# Patient Record
Sex: FEMALE | Race: BLACK OR AFRICAN AMERICAN | NOT HISPANIC OR LATINO | Employment: UNEMPLOYED | URBAN - METROPOLITAN AREA
[De-identification: names, ages, dates, MRNs, and addresses within clinical notes are randomized per-mention and may not be internally consistent; named-entity substitution may affect disease eponyms.]

---

## 2017-06-22 ENCOUNTER — TELEPHONE (OUTPATIENT)
Dept: OBSTETRICS AND GYNECOLOGY | Facility: CLINIC | Age: 32
End: 2017-06-22

## 2017-06-22 NOTE — TELEPHONE ENCOUNTER
----- Message from Stephanie Tompkins sent at 6/22/2017 10:44 AM CDT -----  Contact: pt  Pt would like a same day new ob appt in [place] this afternoon if at all possible.  Her pregnancy was confirmed by her pcp Dr Kapoor by blood test and he told her her HCG was elevated.  She is in extreme discomfort with burning in her back and severe nausea and vomiting and diarrhea and also fatigue.  Please call pt ASAP at [phone]

## 2017-06-27 ENCOUNTER — OFFICE VISIT (OUTPATIENT)
Dept: OBSTETRICS AND GYNECOLOGY | Facility: CLINIC | Age: 32
End: 2017-06-27
Payer: MEDICAID

## 2017-06-27 ENCOUNTER — LAB VISIT (OUTPATIENT)
Dept: LAB | Facility: HOSPITAL | Age: 32
End: 2017-06-27
Attending: OBSTETRICS & GYNECOLOGY
Payer: MEDICAID

## 2017-06-27 ENCOUNTER — PROCEDURE VISIT (OUTPATIENT)
Dept: OBSTETRICS AND GYNECOLOGY | Facility: CLINIC | Age: 32
End: 2017-06-27
Payer: MEDICAID

## 2017-06-27 VITALS
DIASTOLIC BLOOD PRESSURE: 84 MMHG | SYSTOLIC BLOOD PRESSURE: 126 MMHG | WEIGHT: 201.75 LBS | BODY MASS INDEX: 38.09 KG/M2 | HEIGHT: 61 IN

## 2017-06-27 DIAGNOSIS — O09.32 LATE PRENATAL CARE AFFECTING PREGNANCY IN SECOND TRIMESTER: ICD-10-CM

## 2017-06-27 DIAGNOSIS — Z32.01 PREGNANCY EXAMINATION OR TEST, POSITIVE RESULT: ICD-10-CM

## 2017-06-27 DIAGNOSIS — Z98.891 PREVIOUS CESAREAN SECTION: ICD-10-CM

## 2017-06-27 DIAGNOSIS — Z85.07 PERSONAL HISTORY OF PANCREATIC CANCER: ICD-10-CM

## 2017-06-27 DIAGNOSIS — Z30.2 REQUEST FOR STERILIZATION: ICD-10-CM

## 2017-06-27 DIAGNOSIS — Z36.3 ENCOUNTER FOR ROUTINE SCREENING FOR MALFORMATION USING ULTRASONICS: ICD-10-CM

## 2017-06-27 DIAGNOSIS — Z32.01 PREGNANCY EXAMINATION OR TEST, POSITIVE RESULT: Primary | ICD-10-CM

## 2017-06-27 LAB
ABO + RH BLD: NORMAL
BASOPHILS # BLD AUTO: 0.02 K/UL
BASOPHILS NFR BLD: 0.2 %
BLD GP AB SCN CELLS X3 SERPL QL: NORMAL
DIFFERENTIAL METHOD: ABNORMAL
EOSINOPHIL # BLD AUTO: 0.1 K/UL
EOSINOPHIL NFR BLD: 1.5 %
ERYTHROCYTE [DISTWIDTH] IN BLOOD BY AUTOMATED COUNT: 16 %
HCT VFR BLD AUTO: 34.5 %
HGB BLD-MCNC: 11 G/DL
HGB S BLD QL SOLY: NEGATIVE
LYMPHOCYTES # BLD AUTO: 2.3 K/UL
LYMPHOCYTES NFR BLD: 24.9 %
MCH RBC QN AUTO: 28.1 PG
MCHC RBC AUTO-ENTMCNC: 31.9 %
MCV RBC AUTO: 88 FL
MONOCYTES # BLD AUTO: 0.7 K/UL
MONOCYTES NFR BLD: 7.6 %
NEUTROPHILS # BLD AUTO: 5.9 K/UL
NEUTROPHILS NFR BLD: 64.8 %
PLATELET # BLD AUTO: 319 K/UL
PMV BLD AUTO: 12.2 FL
RBC # BLD AUTO: 3.92 M/UL
WBC # BLD AUTO: 9.12 K/UL

## 2017-06-27 PROCEDURE — 86703 HIV-1/HIV-2 1 RESULT ANTBDY: CPT

## 2017-06-27 PROCEDURE — 99203 OFFICE O/P NEW LOW 30 MIN: CPT | Mod: PBBFAC,25,PN | Performed by: OBSTETRICS & GYNECOLOGY

## 2017-06-27 PROCEDURE — 99202 OFFICE O/P NEW SF 15 MIN: CPT | Mod: 25,TH,S$PBB, | Performed by: OBSTETRICS & GYNECOLOGY

## 2017-06-27 PROCEDURE — 87340 HEPATITIS B SURFACE AG IA: CPT

## 2017-06-27 PROCEDURE — 86901 BLOOD TYPING SEROLOGIC RH(D): CPT

## 2017-06-27 PROCEDURE — 85660 RBC SICKLE CELL TEST: CPT

## 2017-06-27 PROCEDURE — 86762 RUBELLA ANTIBODY: CPT

## 2017-06-27 PROCEDURE — 86592 SYPHILIS TEST NON-TREP QUAL: CPT

## 2017-06-27 PROCEDURE — 86900 BLOOD TYPING SEROLOGIC ABO: CPT

## 2017-06-27 PROCEDURE — 85025 COMPLETE CBC W/AUTO DIFF WBC: CPT

## 2017-06-27 PROCEDURE — 76805 OB US >/= 14 WKS SNGL FETUS: CPT | Mod: 26,S$PBB,, | Performed by: OBSTETRICS & GYNECOLOGY

## 2017-06-27 PROCEDURE — 99999 PR PBB SHADOW E&M-NEW PATIENT-LVL III: CPT | Mod: PBBFAC,,, | Performed by: OBSTETRICS & GYNECOLOGY

## 2017-06-27 PROCEDURE — 36415 COLL VENOUS BLD VENIPUNCTURE: CPT | Mod: PO

## 2017-06-27 NOTE — PROGRESS NOTES
"Subjective:      Marylou Sharma is a 31 y.o. female who presents for evaluation of amenorrhea. She believes she could be pregnant. Patient is ambivalent about pregnancy. but is proceeding with same. Was unplanned. In stable relationship.  Sexual Activity: single partner, contraception: none. Current symptoms also include: fatigue, positive home pregnancy test and fetal movement. . Last period was normal.    H/o successful  with last birth ~ and would like to  again. (2 prev CS. After  first preg.)  Desires permanent sterilization.     Patient states had a successful  in past and would like to  again.  Desires permanent sterilization.     Pancreatic cancer with Whipple .      Patient's last menstrual period was 2017 (exact date).  The following portions of the patient's history were reviewed and updated as appropriate: allergies, current medications, past family history, past medical history, past social history, past surgical history and problem list.    Review of Systems  A comprehensive review of systems was negative.       Objective:      /84   Ht 5' 1" (1.549 m)   Wt 91.5 kg (201 lb 11.5 oz)   LMP 2017 (Exact Date)   BMI 38.11 kg/m²   General: alert, appears stated age, cooperative, no distress and slightly anxious. HEENT:  Cecily, sclera non-icteric, ENT essentially clearly.  Chest :  CTA, HRRRR w/o murmer.  Abd: gravid:  Estimated FH ~ 24 wks. , +FHT's 149, Abd. Soft and non-tender.   Pelvic: Deferred.  Ext: Nick symmetric with = strength and tone.          Lab Review  Urine HCG: positive      Assessment:      Pregnancy exam positive    ~ 22-24 wks by LMP.    H/o Previous C/S & successful    Desires  again.   Desires permanent sterilization.   H/o Pancreatic cancer w/ whipple in        Plan:      Pregnancy Test: Positive: EDC: 10/31/2017. Briefly discussed pre-katalina care options. Pregnancy, Childbirth and the  book given. Encouraged well-balanced " diet, plenty of rest when needed, pre-katalina vitamins daily and walking for exercise. Discussed self-help for nausea, avoiding OTC medications until consulting provider or pharmacist, other than Tylenol as needed, minimal caffeine (1-2 cups daily) and avoiding alcohol. She will schedule her initial OB visit in the next month with her PCP or OB provider. Feel free to call with any questions. Will discuss desires for  agian Will consider desires for  with this pegnancy.    1. Will schedule for all of NOB lab profile with  Cervical cultures for next OV.  2. Consent for tubal at next ov.   3. US for dating and anatomical survery ASAP.   4. RTC 1-2 wks.   5. Consult MD re: plan of care and follow up as relates to h/o pancreatic CA.         ADDENDUM:    US done after OV :  Incongruent with EDC by LMP. Will use assigned date of 2017 as baby 26.6 weeks EGA..  Baby in 47% for growth.    HR; 138  Placenta: Anterior.  Cord:  3v  Amniotic fluid:  Normal.     Unable to visualize cord insertion and extremities (arms)

## 2017-06-28 LAB
HBV SURFACE AG SERPL QL IA: NEGATIVE
HIV 1+2 AB+HIV1 P24 AG SERPL QL IA: NEGATIVE
RPR SER QL: NORMAL
RUBV IGG SER-ACNC: 131 IU/ML
RUBV IGG SER-IMP: REACTIVE

## 2017-07-17 ENCOUNTER — LAB VISIT (OUTPATIENT)
Dept: LAB | Facility: HOSPITAL | Age: 32
End: 2017-07-17
Attending: OBSTETRICS & GYNECOLOGY
Payer: MEDICAID

## 2017-07-17 ENCOUNTER — ROUTINE PRENATAL (OUTPATIENT)
Dept: OBSTETRICS AND GYNECOLOGY | Facility: CLINIC | Age: 32
End: 2017-07-17
Payer: MEDICAID

## 2017-07-17 VITALS
DIASTOLIC BLOOD PRESSURE: 72 MMHG | BODY MASS INDEX: 38.76 KG/M2 | WEIGHT: 205.13 LBS | SYSTOLIC BLOOD PRESSURE: 112 MMHG

## 2017-07-17 DIAGNOSIS — Z34.82 PRENATAL CARE, SUBSEQUENT PREGNANCY, SECOND TRIMESTER: Primary | ICD-10-CM

## 2017-07-17 DIAGNOSIS — Z11.3 SCREENING FOR GONORRHEA: ICD-10-CM

## 2017-07-17 DIAGNOSIS — N76.0 BACTERIAL VAGINOSIS: ICD-10-CM

## 2017-07-17 DIAGNOSIS — B96.89 BACTERIAL VAGINOSIS: ICD-10-CM

## 2017-07-17 DIAGNOSIS — Z34.82 PRENATAL CARE, SUBSEQUENT PREGNANCY, SECOND TRIMESTER: ICD-10-CM

## 2017-07-17 LAB
CANDIDA RRNA VAG QL PROBE: POSITIVE
G VAGINALIS RRNA GENITAL QL PROBE: POSITIVE
GLUCOSE SERPL-MCNC: 94 MG/DL
T VAGINALIS RRNA GENITAL QL PROBE: NEGATIVE

## 2017-07-17 PROCEDURE — 87591 N.GONORRHOEAE DNA AMP PROB: CPT

## 2017-07-17 PROCEDURE — 87480 CANDIDA DNA DIR PROBE: CPT

## 2017-07-17 PROCEDURE — 82950 GLUCOSE TEST: CPT

## 2017-07-17 PROCEDURE — 87660 TRICHOMONAS VAGIN DIR PROBE: CPT

## 2017-07-17 PROCEDURE — 99213 OFFICE O/P EST LOW 20 MIN: CPT | Mod: TH,S$PBB,, | Performed by: OBSTETRICS & GYNECOLOGY

## 2017-07-17 PROCEDURE — 99999 PR PBB SHADOW E&M-EST. PATIENT-LVL II: CPT | Mod: PBBFAC,,, | Performed by: OBSTETRICS & GYNECOLOGY

## 2017-07-17 PROCEDURE — 36415 COLL VENOUS BLD VENIPUNCTURE: CPT | Mod: PO

## 2017-07-17 PROCEDURE — 87086 URINE CULTURE/COLONY COUNT: CPT

## 2017-07-17 RX ORDER — FLUCONAZOLE 150 MG/1
150 TABLET ORAL DAILY
Qty: 1 TABLET | Refills: 0 | Status: SHIPPED | OUTPATIENT
Start: 2017-07-17 | End: 2017-07-18

## 2017-07-17 NOTE — PROGRESS NOTES
Reviewed edc--dated by candice  Still wants trial of labor (but may not want epidural)  Considering permanent sterilization  ucx today  glucola today  C/o vaginal discharge--affirm/gc/ct today  Suspect yeast; rx sent  Sign tl consents next visit

## 2017-07-18 LAB
BACTERIA UR CULT: NORMAL
BACTERIA UR CULT: NORMAL
C TRACH DNA SPEC QL NAA+PROBE: NOT DETECTED
N GONORRHOEA DNA SPEC QL NAA+PROBE: NOT DETECTED

## 2017-07-20 ENCOUNTER — TELEPHONE (OUTPATIENT)
Dept: OBSTETRICS AND GYNECOLOGY | Facility: CLINIC | Age: 32
End: 2017-07-20

## 2017-07-20 DIAGNOSIS — B96.89 BACTERIAL VAGINOSIS: Primary | ICD-10-CM

## 2017-07-20 DIAGNOSIS — B37.31 YEAST VAGINITIS: ICD-10-CM

## 2017-07-20 DIAGNOSIS — N76.0 BACTERIAL VAGINOSIS: Primary | ICD-10-CM

## 2017-07-20 RX ORDER — FLUCONAZOLE 150 MG/1
150 TABLET ORAL DAILY
Qty: 1 TABLET | Refills: 0 | Status: SHIPPED | OUTPATIENT
Start: 2017-07-20 | End: 2017-07-21

## 2017-07-20 RX ORDER — METRONIDAZOLE 500 MG/1
500 TABLET ORAL EVERY 12 HOURS
Qty: 14 TABLET | Refills: 0 | Status: SHIPPED | OUTPATIENT
Start: 2017-07-20 | End: 2017-07-27

## 2017-07-28 ENCOUNTER — PROCEDURE VISIT (OUTPATIENT)
Dept: OBSTETRICS AND GYNECOLOGY | Facility: CLINIC | Age: 32
End: 2017-07-28
Payer: MEDICAID

## 2017-07-28 DIAGNOSIS — O99.213 OBESITY COMPLICATING PREGNANCY IN THIRD TRIMESTER: ICD-10-CM

## 2017-07-28 DIAGNOSIS — Z3A.31 31 WEEKS GESTATION OF PREGNANCY: ICD-10-CM

## 2017-07-28 PROCEDURE — 76819 FETAL BIOPHYS PROFIL W/O NST: CPT | Mod: 26,S$PBB,, | Performed by: OBSTETRICS & GYNECOLOGY

## 2017-07-28 PROCEDURE — 76819 FETAL BIOPHYS PROFIL W/O NST: CPT | Mod: PBBFAC,PN | Performed by: OBSTETRICS & GYNECOLOGY

## 2017-07-28 PROCEDURE — 76816 OB US FOLLOW-UP PER FETUS: CPT | Mod: PBBFAC,PN | Performed by: OBSTETRICS & GYNECOLOGY

## 2017-07-28 PROCEDURE — 76816 OB US FOLLOW-UP PER FETUS: CPT | Mod: 26,S$PBB,, | Performed by: OBSTETRICS & GYNECOLOGY

## 2017-07-31 ENCOUNTER — ROUTINE PRENATAL (OUTPATIENT)
Dept: OBSTETRICS AND GYNECOLOGY | Facility: CLINIC | Age: 32
End: 2017-07-31
Payer: MEDICAID

## 2017-07-31 VITALS
SYSTOLIC BLOOD PRESSURE: 115 MMHG | DIASTOLIC BLOOD PRESSURE: 72 MMHG | BODY MASS INDEX: 39.28 KG/M2 | WEIGHT: 207.88 LBS

## 2017-07-31 DIAGNOSIS — O09.33 INSUFFICIENT PRENATAL CARE IN THIRD TRIMESTER: Primary | ICD-10-CM

## 2017-07-31 PROCEDURE — 99213 OFFICE O/P EST LOW 20 MIN: CPT | Mod: TH,S$PBB,, | Performed by: OBSTETRICS & GYNECOLOGY

## 2017-07-31 PROCEDURE — 99999 PR PBB SHADOW E&M-EST. PATIENT-LVL II: CPT | Mod: PBBFAC,,, | Performed by: OBSTETRICS & GYNECOLOGY

## 2017-07-31 PROCEDURE — 99212 OFFICE O/P EST SF 10 MIN: CPT | Mod: PBBFAC,PN | Performed by: OBSTETRICS & GYNECOLOGY

## 2017-07-31 NOTE — PROGRESS NOTES
Reports occas contractions  +fetal movement, no srom, no vag bleeding  Still wants trial of labor;  consents signed  Tl consents signed  Took all meds for bv/yeast  Aware of wnl glucola

## 2017-08-21 ENCOUNTER — ROUTINE PRENATAL (OUTPATIENT)
Dept: OBSTETRICS AND GYNECOLOGY | Facility: CLINIC | Age: 32
End: 2017-08-21
Payer: MEDICAID

## 2017-08-21 VITALS
BODY MASS INDEX: 39.32 KG/M2 | WEIGHT: 208.13 LBS | DIASTOLIC BLOOD PRESSURE: 76 MMHG | SYSTOLIC BLOOD PRESSURE: 113 MMHG

## 2017-08-21 DIAGNOSIS — O09.33 INSUFFICIENT PRENATAL CARE IN THIRD TRIMESTER: ICD-10-CM

## 2017-08-21 DIAGNOSIS — O09.33 INSUFFICIENT PRENATAL CARE IN THIRD TRIMESTER: Primary | ICD-10-CM

## 2017-08-21 PROCEDURE — 99212 OFFICE O/P EST SF 10 MIN: CPT | Mod: PBBFAC,PN | Performed by: OBSTETRICS & GYNECOLOGY

## 2017-08-21 PROCEDURE — 3008F BODY MASS INDEX DOCD: CPT | Mod: ,,, | Performed by: OBSTETRICS & GYNECOLOGY

## 2017-08-21 PROCEDURE — 99213 OFFICE O/P EST LOW 20 MIN: CPT | Mod: TH,S$PBB,, | Performed by: OBSTETRICS & GYNECOLOGY

## 2017-08-21 PROCEDURE — 99999 PR PBB SHADOW E&M-EST. PATIENT-LVL II: CPT | Mod: PBBFAC,,, | Performed by: OBSTETRICS & GYNECOLOGY

## 2017-08-21 NOTE — PROGRESS NOTES
C/o lower back pains  Still wants trial of labor  Encouraged daily walking  Continue prenatal vitamin  Cbc next visit  gbs next visit; f/u sono for growth next visit

## 2017-08-28 ENCOUNTER — PROCEDURE VISIT (OUTPATIENT)
Dept: OBSTETRICS AND GYNECOLOGY | Facility: CLINIC | Age: 32
End: 2017-08-28
Payer: MEDICAID

## 2017-08-28 ENCOUNTER — LAB VISIT (OUTPATIENT)
Dept: LAB | Facility: HOSPITAL | Age: 32
End: 2017-08-28
Attending: OBSTETRICS & GYNECOLOGY
Payer: MEDICAID

## 2017-08-28 ENCOUNTER — ROUTINE PRENATAL (OUTPATIENT)
Dept: OBSTETRICS AND GYNECOLOGY | Facility: CLINIC | Age: 32
End: 2017-08-28
Payer: MEDICAID

## 2017-08-28 VITALS
SYSTOLIC BLOOD PRESSURE: 111 MMHG | BODY MASS INDEX: 39.22 KG/M2 | WEIGHT: 207.56 LBS | DIASTOLIC BLOOD PRESSURE: 70 MMHG

## 2017-08-28 DIAGNOSIS — Z36.85 ANTENATAL SCREENING FOR STREPTOCOCCUS B: ICD-10-CM

## 2017-08-28 DIAGNOSIS — O09.33 INSUFFICIENT PRENATAL CARE IN THIRD TRIMESTER: ICD-10-CM

## 2017-08-28 DIAGNOSIS — Z98.891 PREVIOUS CESAREAN SECTION: Primary | ICD-10-CM

## 2017-08-28 LAB
ERYTHROCYTE [DISTWIDTH] IN BLOOD BY AUTOMATED COUNT: 16.6 %
HCT VFR BLD AUTO: 33 %
HGB BLD-MCNC: 10.7 G/DL
MCH RBC QN AUTO: 26.4 PG
MCHC RBC AUTO-ENTMCNC: 32.4 G/DL
MCV RBC AUTO: 82 FL
PLATELET # BLD AUTO: 311 K/UL
PMV BLD AUTO: 12.4 FL
RBC # BLD AUTO: 4.05 M/UL
WBC # BLD AUTO: 8.61 K/UL

## 2017-08-28 PROCEDURE — 76819 FETAL BIOPHYS PROFIL W/O NST: CPT | Mod: 26,S$PBB,, | Performed by: OBSTETRICS & GYNECOLOGY

## 2017-08-28 PROCEDURE — 99999 PR PBB SHADOW E&M-EST. PATIENT-LVL II: CPT | Mod: PBBFAC,,, | Performed by: OBSTETRICS & GYNECOLOGY

## 2017-08-28 PROCEDURE — 87081 CULTURE SCREEN ONLY: CPT

## 2017-08-28 PROCEDURE — 76816 OB US FOLLOW-UP PER FETUS: CPT | Mod: 26,S$PBB,, | Performed by: OBSTETRICS & GYNECOLOGY

## 2017-08-28 PROCEDURE — 3008F BODY MASS INDEX DOCD: CPT | Mod: ,,, | Performed by: OBSTETRICS & GYNECOLOGY

## 2017-08-28 PROCEDURE — 99212 OFFICE O/P EST SF 10 MIN: CPT | Mod: PBBFAC,PN | Performed by: OBSTETRICS & GYNECOLOGY

## 2017-08-28 PROCEDURE — 99213 OFFICE O/P EST LOW 20 MIN: CPT | Mod: TH,S$PBB,25, | Performed by: OBSTETRICS & GYNECOLOGY

## 2017-08-28 RX ORDER — HYDROCORTISONE VALERATE CREAM 2 MG/G
CREAM TOPICAL 2 TIMES DAILY
Qty: 45 G | Refills: 1 | Status: ON HOLD | OUTPATIENT
Start: 2017-08-28 | End: 2017-09-22 | Stop reason: HOSPADM

## 2017-08-28 RX ORDER — FLUCONAZOLE 150 MG/1
150 TABLET ORAL DAILY
Qty: 1 TABLET | Refills: 0 | Status: SHIPPED | OUTPATIENT
Start: 2017-08-28 | End: 2017-08-29

## 2017-08-28 NOTE — PROGRESS NOTES
C/o hemorrhoid--has used several otc treatments/heat; hemorrhoid present, not thrombosed; rx sent for hydrocortisone cream  Also feels her yeast is still present; rx sent for diflucan  +fetal movement, no srom, no vag bleeding  Aware--snoo-vertex, chaz wnl, 6lb2oz, 37%; bpp 8/8  Still wants trial of labor  Reports she is still working; encouraged continued ambulation; aware for trial of labor --need spontaneous labor to occur  gbs today  Cbc, hiv,rpr today  Reviewed kick counts/labor precautions

## 2017-09-01 LAB — BACTERIA SPEC AEROBE CULT: NORMAL

## 2017-09-18 ENCOUNTER — ROUTINE PRENATAL (OUTPATIENT)
Dept: OBSTETRICS AND GYNECOLOGY | Facility: CLINIC | Age: 32
End: 2017-09-18
Payer: MEDICAID

## 2017-09-18 VITALS — DIASTOLIC BLOOD PRESSURE: 75 MMHG | WEIGHT: 209 LBS | BODY MASS INDEX: 39.49 KG/M2 | SYSTOLIC BLOOD PRESSURE: 118 MMHG

## 2017-09-18 DIAGNOSIS — Z98.891 PREVIOUS CESAREAN SECTION: Primary | ICD-10-CM

## 2017-09-18 DIAGNOSIS — O09.33 INSUFFICIENT PRENATAL CARE IN THIRD TRIMESTER: ICD-10-CM

## 2017-09-18 PROCEDURE — 99213 OFFICE O/P EST LOW 20 MIN: CPT | Mod: TH,S$PBB,, | Performed by: OBSTETRICS & GYNECOLOGY

## 2017-09-18 PROCEDURE — 99999 PR PBB SHADOW E&M-EST. PATIENT-LVL II: CPT | Mod: PBBFAC,,, | Performed by: OBSTETRICS & GYNECOLOGY

## 2017-09-18 PROCEDURE — 3008F BODY MASS INDEX DOCD: CPT | Mod: ,,, | Performed by: OBSTETRICS & GYNECOLOGY

## 2017-09-18 PROCEDURE — 99212 OFFICE O/P EST SF 10 MIN: CPT | Mod: PBBFAC,PN | Performed by: OBSTETRICS & GYNECOLOGY

## 2017-09-18 RX ORDER — FLUCONAZOLE 150 MG/1
150 TABLET ORAL DAILY
Qty: 1 TABLET | Refills: 0 | Status: SHIPPED | OUTPATIENT
Start: 2017-09-18 | End: 2017-09-19

## 2017-09-18 NOTE — PROGRESS NOTES
C/o increased pressure  Still wants trial of labor; labor precautions reviewed  Labor precautions reviewed--safe sex  Aware of neg gbs  Reminded pt to resume iron

## 2017-09-22 ENCOUNTER — HOSPITAL ENCOUNTER (OUTPATIENT)
Facility: HOSPITAL | Age: 32
Discharge: HOME | End: 2017-09-23
Attending: OBSTETRICS & GYNECOLOGY | Admitting: OBSTETRICS & GYNECOLOGY
Payer: MEDICAID

## 2017-09-22 VITALS
WEIGHT: 219 LBS | HEIGHT: 61 IN | DIASTOLIC BLOOD PRESSURE: 72 MMHG | RESPIRATION RATE: 18 BRPM | SYSTOLIC BLOOD PRESSURE: 117 MMHG | BODY MASS INDEX: 41.35 KG/M2 | HEART RATE: 96 BPM

## 2017-09-22 DIAGNOSIS — R10.9 ABDOMINAL PAIN AFFECTING PREGNANCY: Primary | ICD-10-CM

## 2017-09-22 DIAGNOSIS — R10.9 ABDOMINAL PAIN IN PREGNANCY, THIRD TRIMESTER: ICD-10-CM

## 2017-09-22 DIAGNOSIS — O26.893 ABDOMINAL PAIN IN PREGNANCY, THIRD TRIMESTER: ICD-10-CM

## 2017-09-22 DIAGNOSIS — O26.899 ABDOMINAL PAIN AFFECTING PREGNANCY: Primary | ICD-10-CM

## 2017-09-22 PROCEDURE — 99213 OFFICE O/P EST LOW 20 MIN: CPT | Mod: TH,,, | Performed by: ADVANCED PRACTICE MIDWIFE

## 2017-09-22 RX ORDER — HYDROXYZINE HYDROCHLORIDE 25 MG/ML
50 INJECTION, SOLUTION INTRAMUSCULAR ONCE
Status: COMPLETED | OUTPATIENT
Start: 2017-09-23 | End: 2017-09-23

## 2017-09-22 RX ORDER — ONDANSETRON 8 MG/1
8 TABLET, ORALLY DISINTEGRATING ORAL EVERY 8 HOURS PRN
Status: DISCONTINUED | OUTPATIENT
Start: 2017-09-22 | End: 2017-09-23 | Stop reason: HOSPADM

## 2017-09-22 RX ORDER — ACETAMINOPHEN 500 MG
500 TABLET ORAL EVERY 6 HOURS PRN
Status: DISCONTINUED | OUTPATIENT
Start: 2017-09-22 | End: 2017-09-23 | Stop reason: HOSPADM

## 2017-09-22 RX ORDER — MORPHINE SULFATE 10 MG/ML
10 INJECTION INTRAMUSCULAR; INTRAVENOUS; SUBCUTANEOUS ONCE
Status: COMPLETED | OUTPATIENT
Start: 2017-09-23 | End: 2017-09-23

## 2017-09-23 PROCEDURE — 96372 THER/PROPH/DIAG INJ SC/IM: CPT

## 2017-09-23 PROCEDURE — 59025 FETAL NON-STRESS TEST: CPT

## 2017-09-23 PROCEDURE — 99211 OFF/OP EST MAY X REQ PHY/QHP: CPT | Mod: 25

## 2017-09-23 PROCEDURE — 63600175 PHARM REV CODE 636 W HCPCS: Performed by: ADVANCED PRACTICE MIDWIFE

## 2017-09-23 RX ADMIN — HYDROXYZINE HYDROCHLORIDE 50 MG: 25 INJECTION, SOLUTION INTRAMUSCULAR at 12:09

## 2017-09-23 RX ADMIN — MORPHINE SULFATE 10 MG: 10 INJECTION, SOLUTION INTRAMUSCULAR; INTRAVENOUS at 12:09

## 2017-09-23 NOTE — NURSING
Patient asked about being a confidential patient. Nurse explained to patient appropriate reasons for being opt-out, patient declined stating that there was not one she did not want up here she is just a private person.

## 2017-09-23 NOTE — H&P
[facility] -   Obstetrics  History & Physical    Patient Name: Marylou Sharma  MRN: [id number]  Admission Date: 2017  Primary Care Provider: Benedicto Hu MD    Subjective:     Principal Problem:Abdominal pain affecting pregnancy    History of Present Illness:  Reports to L&D for contractions    Obstetric HPI:  Patient reports Intensity: moderate contractions that begun at 1300, active fetal movement, No vaginal bleeding , No loss of fluid     This pregnancy has been complicated by   Previous  section x 1   Late prenatal care   Hx of pancreatic CA    Obstetric History       T0      L4     SAB0   TAB0   Ectopic1   Multiple0   Live Births4       # Outcome Date GA Lbr Jeremy/2nd Weight Sex Delivery Anes PTL Lv   6 Current            5 Ectopic         DEC   4 Para      Vag-Spont   MELISSA   3 Para      Vag-Spont   MELISSA   2 Para      CS-LTranv   MELISSA   1 Para      CS-LTranv   MELISSA        Past Medical History:   Diagnosis Date    Miscarriage     Pancreas cancer      Past Surgical History:   Procedure Laterality Date     SECTION  2003     SECTION  2009    INDUCED       WHIPPLE PROCEDURE W/ LAPAROSCOPY         PTA Medications   Medication Sig    docosahexanoic acid (PRENATAL DHA) 200 mg Cap Take by mouth.    hydrocortisone (WESTCORT) 0.2 % cream Apply topically 2 (two) times daily.    prenatal vit69-iron-folate6-dh 27 mg iron- 1 mg-400 mg Cap Take 1 tablet by mouth once daily.       Review of patient's allergies indicates:  No Known Allergies     Family History     Problem Relation (Age of Onset)    Diabetes Father, Mother    Hypertension Father    Ovarian cancer Mother    Schizophrenia Mother        Social History Main Topics    Smoking status: Never Smoker    Smokeless tobacco: Never Used    Alcohol use No    Drug use: No    Sexual activity: Yes     Partners: Male     Birth control/ protection: Pill     Review of Systems   Constitutional:  Negative.    HENT: Negative.    Eyes: Negative.    Respiratory: Negative.    Cardiovascular: Negative.    Gastrointestinal: Positive for abdominal pain.   Endocrine: Negative.    Genitourinary: Negative.    Musculoskeletal: Negative.    Skin:  Negative.   Neurological: Negative.    Hematological: Negative.    Psychiatric/Behavioral: Negative.    Breast: negative.    All other systems reviewed and are negative.     Objective:     Vital Signs (Most Recent):    Vital Signs (24h Range):           There is no height or weight on file to calculate BMI.    FHT: 130 Cat 1 (reassuring)  TOCO:   rare    Physical Exam:   Constitutional: She is oriented to person, place, and time. Vital signs are normal. She appears well-developed and well-nourished. She is cooperative.    HENT:   Head: Normocephalic and atraumatic.       Pulmonary/Chest: Effort normal.        Abdominal: Soft.   Gravid, non-tender     Genitourinary: Vagina normal and uterus normal. Pelvic exam was performed with patient supine. Cervix is normal. Labial bartholins normal.          Musculoskeletal: Normal range of motion and moves all extremeties.       Neurological: She is alert and oriented to person, place, and time. She has normal strength and normal reflexes.    Skin: Skin is warm, dry and intact. Capillary refill takes less than 2 seconds.    Psychiatric: She has a normal mood and affect. Her speech is normal and behavior is normal. Judgment and thought content normal. Cognition and memory are normal.       Cervix:  Dilation:  4  Effacement:  60  Station: -3  Presentation: Vertex     Significant Labs:  Lab Results   Component Value Date    GROUPTRH O POS 2017    HEPBSAG Negative 2017    STREPBCULT No Group B Streptococcus isolated 2017       I have personallly reviewed all pertinent lab results from the last 24 hours.    Assessment/Plan:     31 y.o. female  at 39w2d for:    * Abdominal pain affecting pregnancy    Labor check              Carrington Alston, GARCÍA  Obstetrics  [facility]

## 2017-09-23 NOTE — NURSING
After repeat vag exam. Nurse attempt to fix ultrasound, patient swats at nurses hand and says no. She states she does not want her monitors fixed right now. Nurse stated that fetal monitoring was essential to ensure fetal wellbeing. Patient declined at this time.
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
## 2017-09-23 NOTE — SUBJECTIVE & OBJECTIVE
Obstetric HPI:  Patient reports Intensity: moderate contractions that begun at 1300, active fetal movement, No vaginal bleeding , No loss of fluid     This pregnancy has been complicated by   Previous  section x 1   Late prenatal care   Hx of pancreatic CA    Obstetric History       T0      L4     SAB0   TAB0   Ectopic1   Multiple0   Live Births4       # Outcome Date GA Lbr Jeremy/2nd Weight Sex Delivery Anes PTL Lv   6 Current            5 Ectopic         DEC   4 Para      Vag-Spont   MELISSA   3 Para      Vag-Spont   MELISSA   2 Para      CS-LTranv   MELISSA   1 Para      CS-LTranv   MELISSA        Past Medical History:   Diagnosis Date    Miscarriage     Pancreas cancer      Past Surgical History:   Procedure Laterality Date     SECTION  2003     SECTION  2009    INDUCED       WHIPPLE PROCEDURE W/ LAPAROSCOPY         PTA Medications   Medication Sig    docosahexanoic acid (PRENATAL DHA) 200 mg Cap Take by mouth.    hydrocortisone (WESTCORT) 0.2 % cream Apply topically 2 (two) times daily.    prenatal vit69-iron-folate6-dh 27 mg iron- 1 mg-400 mg Cap Take 1 tablet by mouth once daily.       Review of patient's allergies indicates:  No Known Allergies     Family History     Problem Relation (Age of Onset)    Diabetes Father, Mother    Hypertension Father    Ovarian cancer Mother    Schizophrenia Mother        Social History Main Topics    Smoking status: Never Smoker    Smokeless tobacco: Never Used    Alcohol use No    Drug use: No    Sexual activity: Yes     Partners: Male     Birth control/ protection: Pill     Review of Systems   Constitutional: Negative.    HENT: Negative.    Eyes: Negative.    Respiratory: Negative.    Cardiovascular: Negative.    Gastrointestinal: Positive for abdominal pain.   Endocrine: Negative.    Genitourinary: Negative.    Musculoskeletal: Negative.    Skin:  Negative.   Neurological: Negative.    Hematological: Negative.     Psychiatric/Behavioral: Negative.    Breast: negative.    All other systems reviewed and are negative.     Objective:     Vital Signs (Most Recent):    Vital Signs (24h Range):           There is no height or weight on file to calculate BMI.    FHT: 130 Cat 1 (reassuring)  TOCO:   rare    Physical Exam:   Constitutional: She is oriented to person, place, and time. Vital signs are normal. She appears well-developed and well-nourished. She is cooperative.    HENT:   Head: Normocephalic and atraumatic.       Pulmonary/Chest: Effort normal.        Abdominal: Soft.   Gravid, non-tender     Genitourinary: Vagina normal and uterus normal. Pelvic exam was performed with patient supine. Cervix is normal. Labial bartholins normal.          Musculoskeletal: Normal range of motion and moves all extremeties.       Neurological: She is alert and oriented to person, place, and time. She has normal strength and normal reflexes.    Skin: Skin is warm, dry and intact. Capillary refill takes less than 2 seconds.    Psychiatric: She has a normal mood and affect. Her speech is normal and behavior is normal. Judgment and thought content normal. Cognition and memory are normal.       Cervix:  Dilation:  4  Effacement:  60  Station: -3  Presentation: Vertex     Significant Labs:  Lab Results   Component Value Date    GROUPTRH O POS 06/27/2017    HEPBSAG Negative 06/27/2017    STREPBCULT No Group B Streptococcus isolated 08/28/2017       I have personallly reviewed all pertinent lab results from the last 24 hours.

## 2017-09-23 NOTE — NURSING
Patient being very ugly to staff. Patient hollering at nurse and CNM because she is angry we will not keep her and induce her.

## 2017-09-23 NOTE — NURSING
"cnm spoke to patient . Explained to patient that she has not make any cervical change and has irregular contractions. Patient very ugly and hollering at nurse and CNM stating "this is the worst hospital experience that I have ever had, I can't wait to call dr Guthrie's office Monday" informed patient that she could stay as long as she felt necessary or receive pain medication and go home. Patient chose to receive medication and go home.   "

## 2017-09-23 NOTE — HOSPITAL COURSE
Admit for observation   NST   Recheck cervix in 2 hours  No cervical change   Discharge home with labor precautions

## 2017-09-23 NOTE — DISCHARGE SUMMARY
[facility]  Obstetrics  Discharge Summary      Patient Name: Marylou Sharma  MRN: [id number]  Admission Date: 9/22/2017  Hospital Length of Stay: 0 days  Discharge Date and Time:  09/22/2017 11:49 PM  Attending Physician: Ivanna Ariza, *   Discharging Provider: Carrington Alston CNM  Primary Care Provider: Benedicto Hu MD    HPI: Reports to L&D for contractions    * No surgery found *     Hospital Course:   Admit for observation   NST   Recheck cervix in 2 hours  No cervical change   Discharge home with labor precautions        Final Active Diagnoses:    Diagnosis Date Noted POA    PRINCIPAL PROBLEM:  Abdominal pain affecting pregnancy [O26.899, R10.9] 09/22/2017 Yes      Problems Resolved During this Admission:    Diagnosis Date Noted Date Resolved POA    Abdominal pain in pregnancy [O26.899, R10.9] 09/22/2017 09/22/2017 Yes        Labs: All labs within the past 24 hours have been reviewed    Immunizations     None          This patient has no babies on file.  Pending Diagnostic Studies:     None          Discharged Condition: good    Disposition: Home or Self Care    Follow Up:  Follow-up Information     Neha Guthrie MD.    Specialty:  Obstetrics and Gynecology  Why:  Monday   Contact information:  [address] [place] [id number] [phone]                 Patient Instructions:     Diet general       Medications:  Current Discharge Medication List      STOP taking these medications       docosahexanoic acid (PRENATAL DHA) 200 mg Cap Comments:   Reason for Stopping:         hydrocortisone (WESTCORT) 0.2 % cream Comments:   Reason for Stopping:         prenatal vit69-iron-folate6-dh 27 mg iron- 1 mg-400 mg Cap Comments:   Reason for Stopping:               Carrington Alston CNM  Obstetrics  [facility]

## 2017-09-23 NOTE — NURSING
Patient states that she has a birth plan but she did not bring it with her. She states that she wants to go natural and does not want to be asked about pain medicine. She states that she would let the nurse know if she wanted pain medication intervention.

## 2017-09-25 ENCOUNTER — ANESTHESIA EVENT (OUTPATIENT)
Dept: OBSTETRICS AND GYNECOLOGY | Facility: HOSPITAL | Age: 32
End: 2017-09-25
Payer: MEDICAID

## 2017-09-25 ENCOUNTER — ROUTINE PRENATAL (OUTPATIENT)
Dept: OBSTETRICS AND GYNECOLOGY | Facility: CLINIC | Age: 32
End: 2017-09-25
Payer: MEDICAID

## 2017-09-25 ENCOUNTER — HOSPITAL ENCOUNTER (INPATIENT)
Facility: HOSPITAL | Age: 32
LOS: 2 days | Discharge: HOME | End: 2017-09-27
Attending: OBSTETRICS & GYNECOLOGY | Admitting: OBSTETRICS & GYNECOLOGY
Payer: MEDICAID

## 2017-09-25 ENCOUNTER — ANESTHESIA (OUTPATIENT)
Dept: OBSTETRICS AND GYNECOLOGY | Facility: HOSPITAL | Age: 32
End: 2017-09-25
Payer: MEDICAID

## 2017-09-25 VITALS
SYSTOLIC BLOOD PRESSURE: 124 MMHG | WEIGHT: 211.63 LBS | DIASTOLIC BLOOD PRESSURE: 79 MMHG | BODY MASS INDEX: 39.99 KG/M2

## 2017-09-25 DIAGNOSIS — O36.8131 DECREASED FETAL MOVEMENT, THIRD TRIMESTER, FETUS 1: ICD-10-CM

## 2017-09-25 DIAGNOSIS — Z98.891 PREVIOUS CESAREAN SECTION: Primary | ICD-10-CM

## 2017-09-25 DIAGNOSIS — Z98.891 PREVIOUS CESAREAN SECTION: ICD-10-CM

## 2017-09-25 DIAGNOSIS — O34.219 VBAC, DELIVERED, CURRENT HOSPITALIZATION: ICD-10-CM

## 2017-09-25 DIAGNOSIS — Z37.9 NORMAL LABOR: ICD-10-CM

## 2017-09-25 DIAGNOSIS — O36.8190 DECREASED FETAL MOVEMENT: ICD-10-CM

## 2017-09-25 LAB
ABO + RH BLD: NORMAL
BASOPHILS # BLD AUTO: 0.02 K/UL
BASOPHILS NFR BLD: 0.2 %
BLD GP AB SCN CELLS X3 SERPL QL: NORMAL
DIFFERENTIAL METHOD: ABNORMAL
EOSINOPHIL # BLD AUTO: 0.1 K/UL
EOSINOPHIL NFR BLD: 1 %
ERYTHROCYTE [DISTWIDTH] IN BLOOD BY AUTOMATED COUNT: 17.2 %
HCT VFR BLD AUTO: 34.8 %
HGB BLD-MCNC: 10.9 G/DL
HIV1+2 IGG SERPL QL IA.RAPID: NEGATIVE
LYMPHOCYTES # BLD AUTO: 2.2 K/UL
LYMPHOCYTES NFR BLD: 23.7 %
MCH RBC QN AUTO: 25.6 PG
MCHC RBC AUTO-ENTMCNC: 31.3 G/DL
MCV RBC AUTO: 82 FL
MONOCYTES # BLD AUTO: 0.8 K/UL
MONOCYTES NFR BLD: 8.2 %
NEUTROPHILS # BLD AUTO: 6.2 K/UL
NEUTROPHILS NFR BLD: 66.9 %
PLATELET # BLD AUTO: 301 K/UL
PMV BLD AUTO: 11.8 FL
RBC # BLD AUTO: 4.25 M/UL
RPR SER QL: NORMAL
WBC # BLD AUTO: 9.26 K/UL

## 2017-09-25 PROCEDURE — 86592 SYPHILIS TEST NON-TREP QUAL: CPT

## 2017-09-25 PROCEDURE — 63600175 PHARM REV CODE 636 W HCPCS: Performed by: ANESTHESIOLOGY

## 2017-09-25 PROCEDURE — 85025 COMPLETE CBC W/AUTO DIFF WBC: CPT

## 2017-09-25 PROCEDURE — 86900 BLOOD TYPING SEROLOGIC ABO: CPT

## 2017-09-25 PROCEDURE — 86850 RBC ANTIBODY SCREEN: CPT

## 2017-09-25 PROCEDURE — 99212 OFFICE O/P EST SF 10 MIN: CPT | Mod: PBBFAC,PN,25 | Performed by: OBSTETRICS & GYNECOLOGY

## 2017-09-25 PROCEDURE — 72100002 HC LABOR CARE, 1ST 8 HOURS

## 2017-09-25 PROCEDURE — 25000003 PHARM REV CODE 250: Performed by: ADVANCED PRACTICE MIDWIFE

## 2017-09-25 PROCEDURE — 51701 INSERT BLADDER CATHETER: CPT

## 2017-09-25 PROCEDURE — 0UQGXZZ REPAIR VAGINA, EXTERNAL APPROACH: ICD-10-PCS | Performed by: ADVANCED PRACTICE MIDWIFE

## 2017-09-25 PROCEDURE — 25000003 PHARM REV CODE 250: Performed by: ANESTHESIOLOGY

## 2017-09-25 PROCEDURE — 62326 NJX INTERLAMINAR LMBR/SAC: CPT | Performed by: ANESTHESIOLOGY

## 2017-09-25 PROCEDURE — 99213 OFFICE O/P EST LOW 20 MIN: CPT | Mod: TH,S$PBB,, | Performed by: OBSTETRICS & GYNECOLOGY

## 2017-09-25 PROCEDURE — 59409 OBSTETRICAL CARE: CPT | Mod: GB,,, | Performed by: ADVANCED PRACTICE MIDWIFE

## 2017-09-25 PROCEDURE — 86703 HIV-1/HIV-2 1 RESULT ANTBDY: CPT

## 2017-09-25 PROCEDURE — 72200005 HC VAGINAL DELIVERY LEVEL II

## 2017-09-25 PROCEDURE — 27800517 HC TRAY,EPIDURAL-CONTINUOUS: Performed by: NURSE ANESTHETIST, CERTIFIED REGISTERED

## 2017-09-25 PROCEDURE — 99999 PR PBB SHADOW E&M-EST. PATIENT-LVL II: CPT | Mod: PBBFAC,,, | Performed by: OBSTETRICS & GYNECOLOGY

## 2017-09-25 PROCEDURE — 72100003 HC LABOR CARE, EA. ADDL. 8 HRS

## 2017-09-25 PROCEDURE — 3008F BODY MASS INDEX DOCD: CPT | Mod: ,,, | Performed by: OBSTETRICS & GYNECOLOGY

## 2017-09-25 PROCEDURE — 11000001 HC ACUTE MED/SURG PRIVATE ROOM

## 2017-09-25 RX ORDER — IBUPROFEN 400 MG/1
800 TABLET ORAL EVERY 8 HOURS
Status: DISCONTINUED | OUTPATIENT
Start: 2017-09-26 | End: 2017-09-26

## 2017-09-25 RX ORDER — ONDANSETRON 8 MG/1
8 TABLET, ORALLY DISINTEGRATING ORAL EVERY 8 HOURS PRN
Status: DISCONTINUED | OUTPATIENT
Start: 2017-09-25 | End: 2017-09-26

## 2017-09-25 RX ORDER — ONDANSETRON 8 MG/1
8 TABLET, ORALLY DISINTEGRATING ORAL EVERY 8 HOURS PRN
Status: DISCONTINUED | OUTPATIENT
Start: 2017-09-25 | End: 2017-09-25 | Stop reason: SDUPTHER

## 2017-09-25 RX ORDER — OXYTOCIN/RINGER'S LACTATE 20/1000 ML
2 PLASTIC BAG, INJECTION (ML) INTRAVENOUS CONTINUOUS
Status: DISCONTINUED | OUTPATIENT
Start: 2017-09-25 | End: 2017-09-26

## 2017-09-25 RX ORDER — ACETAMINOPHEN 500 MG
500 TABLET ORAL EVERY 6 HOURS PRN
Status: DISCONTINUED | OUTPATIENT
Start: 2017-09-25 | End: 2017-09-25

## 2017-09-25 RX ORDER — SODIUM CHLORIDE, SODIUM LACTATE, POTASSIUM CHLORIDE, CALCIUM CHLORIDE 600; 310; 30; 20 MG/100ML; MG/100ML; MG/100ML; MG/100ML
INJECTION, SOLUTION INTRAVENOUS CONTINUOUS
Status: DISCONTINUED | OUTPATIENT
Start: 2017-09-25 | End: 2017-09-26

## 2017-09-25 RX ORDER — MISOPROSTOL 200 UG/1
600 TABLET ORAL
Status: DISCONTINUED | OUTPATIENT
Start: 2017-09-25 | End: 2017-09-25 | Stop reason: SDUPTHER

## 2017-09-25 RX ORDER — LIDOCAINE HYDROCHLORIDE AND EPINEPHRINE 15; 5 MG/ML; UG/ML
INJECTION, SOLUTION EPIDURAL
Status: DISCONTINUED | OUTPATIENT
Start: 2017-09-25 | End: 2017-09-26

## 2017-09-25 RX ORDER — BUTORPHANOL TARTRATE 1 MG/ML
2 INJECTION INTRAMUSCULAR; INTRAVENOUS
Status: DISCONTINUED | OUTPATIENT
Start: 2017-09-25 | End: 2017-09-26

## 2017-09-25 RX ORDER — KETOROLAC TROMETHAMINE 30 MG/ML
30 INJECTION, SOLUTION INTRAMUSCULAR; INTRAVENOUS EVERY 6 HOURS
Status: DISCONTINUED | OUTPATIENT
Start: 2017-09-25 | End: 2017-09-26

## 2017-09-25 RX ORDER — ROPIVACAINE HYDROCHLORIDE 2 MG/ML
INJECTION, SOLUTION EPIDURAL; INFILTRATION; PERINEURAL CONTINUOUS PRN
Status: DISCONTINUED | OUTPATIENT
Start: 2017-09-25 | End: 2017-09-26

## 2017-09-25 RX ORDER — ROPIVACAINE HYDROCHLORIDE 2 MG/ML
INJECTION, SOLUTION EPIDURAL; INFILTRATION; PERINEURAL
Status: DISCONTINUED | OUTPATIENT
Start: 2017-09-25 | End: 2017-09-26

## 2017-09-25 RX ORDER — ONDANSETRON 8 MG/1
8 TABLET, ORALLY DISINTEGRATING ORAL EVERY 8 HOURS PRN
Status: DISCONTINUED | OUTPATIENT
Start: 2017-09-25 | End: 2017-09-25

## 2017-09-25 RX ORDER — MISOPROSTOL 200 UG/1
600 TABLET ORAL
Status: DISCONTINUED | OUTPATIENT
Start: 2017-09-25 | End: 2017-09-26

## 2017-09-25 RX ORDER — FENTANYL CITRATE 50 UG/ML
INJECTION, SOLUTION INTRAMUSCULAR; INTRAVENOUS
Status: DISCONTINUED | OUTPATIENT
Start: 2017-09-25 | End: 2017-09-26

## 2017-09-25 RX ORDER — BUTORPHANOL TARTRATE 1 MG/ML
2 INJECTION INTRAMUSCULAR; INTRAVENOUS
Status: DISCONTINUED | OUTPATIENT
Start: 2017-09-25 | End: 2017-09-25 | Stop reason: SDUPTHER

## 2017-09-25 RX ORDER — BUTORPHANOL TARTRATE 1 MG/ML
1 INJECTION INTRAMUSCULAR; INTRAVENOUS
Status: DISCONTINUED | OUTPATIENT
Start: 2017-09-25 | End: 2017-09-26

## 2017-09-25 RX ORDER — BUTORPHANOL TARTRATE 1 MG/ML
1 INJECTION INTRAMUSCULAR; INTRAVENOUS
Status: DISCONTINUED | OUTPATIENT
Start: 2017-09-25 | End: 2017-09-25 | Stop reason: SDUPTHER

## 2017-09-25 RX ADMIN — FENTANYL CITRATE 100 MCG: 50 INJECTION, SOLUTION INTRAMUSCULAR; INTRAVENOUS at 11:09

## 2017-09-25 RX ADMIN — LIDOCAINE HYDROCHLORIDE,EPINEPHRINE BITARTRATE 3 ML: 15; .005 INJECTION, SOLUTION EPIDURAL; INFILTRATION; INTRACAUDAL; PERINEURAL at 11:09

## 2017-09-25 RX ADMIN — SODIUM CHLORIDE, SODIUM LACTATE, POTASSIUM CHLORIDE, AND CALCIUM CHLORIDE: 600; 310; 30; 20 INJECTION, SOLUTION INTRAVENOUS at 03:09

## 2017-09-25 RX ADMIN — ROPIVACAINE HYDROCHLORIDE 3 ML: 2 INJECTION, SOLUTION EPIDURAL; INFILTRATION at 09:09

## 2017-09-25 RX ADMIN — ROPIVACAINE HYDROCHLORIDE 12 ML/HR: 2 INJECTION, SOLUTION EPIDURAL; INFILTRATION at 09:09

## 2017-09-25 RX ADMIN — Medication 2 MILLI-UNITS/MIN: at 03:09

## 2017-09-25 RX ADMIN — ROPIVACAINE HYDROCHLORIDE 1 ML: 2 INJECTION, SOLUTION EPIDURAL; INFILTRATION at 09:09

## 2017-09-25 RX ADMIN — ROPIVACAINE HYDROCHLORIDE 4 ML: 2 INJECTION, SOLUTION EPIDURAL; INFILTRATION at 11:09

## 2017-09-25 NOTE — SUBJECTIVE & OBJECTIVE
Obstetric HPI:  Patient reports irregular  contractions, decreased  fetal movement, No vaginal bleeding , No loss of fluid     This pregnancy has been complicated by decreased FM, insufficient PN care. Hx C/S,Previous C/S wit 2x  and desires  this pregnancy    Obstetric History       T0      L4     SAB0   TAB0   Ectopic1   Multiple0   Live Births4       # Outcome Date GA Lbr Jeremy/2nd Weight Sex Delivery Anes PTL Lv   6 Current            5 Ectopic         DEC   4 Para      Vag-Spont   MELISSA   3 Para      Vag-Spont   MELISSA   2 Para      CS-LTranv   MELISSA   1 Para      CS-LTranv   MELISSA        Past Medical History:   Diagnosis Date    Miscarriage     Pancreas cancer      Past Surgical History:   Procedure Laterality Date     SECTION  2003     SECTION  2009    INDUCED       WHIPPLE PROCEDURE W/ LAPAROSCOPY         No prescriptions prior to admission.       Review of patient's allergies indicates:  No Known Allergies     Family History     Problem Relation (Age of Onset)    Diabetes Father, Mother    Hypertension Father    Ovarian cancer Mother    Schizophrenia Mother        Social History Main Topics    Smoking status: Never Smoker    Smokeless tobacco: Never Used    Alcohol use No    Drug use: No    Sexual activity: Yes     Partners: Male     Birth control/ protection: Pill     Review of Systems   Constitutional: Negative.    HENT:        Denies headaches or visual disturbances   Respiratory: Negative for shortness of breath.    Cardiovascular: Negative for leg swelling.   Gastrointestinal: Negative for abdominal pain.   Genitourinary: Negative for dysuria, flank pain, frequency, vaginal bleeding, vaginal discharge and vaginal odor.   Musculoskeletal: Negative for back pain.   Skin:  Negative for rash.   Neurological: Negative for syncope, numbness and headaches.   Psychiatric/Behavioral: Negative for depression.      Objective:     Vital Signs (Most Recent):     Vital Signs (24h Range):  BP: (124)/(79) 124/79        There is no height or weight on file to calculate BMI.    FHT: 140bpm reactiveCat 1 (reassuring)  TOCO:  Q 3-5 minutes    Physical Exam    Cervix:             Per RN-  Dilation:  5  Effacement:  50%  Station: -2  Presentation: Vertex     Significant Labs:  Lab Results   Component Value Date    GROUPTRH O POS 06/27/2017    HEPBSAG Negative 06/27/2017    STREPBCULT No Group B Streptococcus isolated 08/28/2017       I have personallly reviewed all pertinent lab results from the last 24 hours.

## 2017-09-25 NOTE — H&P
[facility] -   Obstetrics  History & Physical    Patient Name: Marylou Sharma  MRN: [id number]   Admission Date: 2017  Primary Care Provider: Benedicto Hu MD    Subjective:     Principal Problem:Decreased fetal movement    History of Present Illness:  17 at 1255hrs- 31yr old  with IUP 39w5d C/O contractions    Obstetric HPI:  Patient reports irregular  contractions, decreased  fetal movement, No vaginal bleeding , No loss of fluid     This pregnancy has been complicated by decreased FM, insufficient PN care. Hx C/S,Previous C/S wit 2x  and desires  this pregnancy    Obstetric History       T0      L4     SAB0   TAB0   Ectopic1   Multiple0   Live Births4       # Outcome Date GA Lbr Jeremy/2nd Weight Sex Delivery Anes PTL Lv   6 Current            5 Ectopic         DEC   4 Para      Vag-Spont   MELISSA   3 Para      Vag-Spont   MELISSA   2 Para      CS-LTranv   MELISSA   1 Para      CS-LTranv   MELISSA        Past Medical History:   Diagnosis Date    Miscarriage     Pancreas cancer      Past Surgical History:   Procedure Laterality Date     SECTION  2003     SECTION  2009    INDUCED       WHIPPLE PROCEDURE W/ LAPAROSCOPY         No prescriptions prior to admission.       Review of patient's allergies indicates:  No Known Allergies     Family History     Problem Relation (Age of Onset)    Diabetes Father, Mother    Hypertension Father    Ovarian cancer Mother    Schizophrenia Mother        Social History Main Topics    Smoking status: Never Smoker    Smokeless tobacco: Never Used    Alcohol use No    Drug use: No    Sexual activity: Yes     Partners: Male     Birth control/ protection: Pill     Review of Systems   Constitutional: Negative.    HENT:        Denies headaches or visual disturbances   Respiratory: Negative for shortness of breath.    Cardiovascular: Negative for leg swelling.   Gastrointestinal: Negative for abdominal pain.    Genitourinary: Negative for dysuria, flank pain, frequency, vaginal bleeding, vaginal discharge and vaginal odor.   Musculoskeletal: Negative for back pain.   Skin:  Negative for rash.   Neurological: Negative for syncope, numbness and headaches.   Psychiatric/Behavioral: Negative for depression.      Objective:     Vital Signs (Most Recent):    Vital Signs (24h Range):  BP: (124)/(79) 124/79        There is no height or weight on file to calculate BMI.    FHT: 140bpm reactiveCat 1 (reassuring)  TOCO:  Q 3-5 minutes    Physical Exam    Cervix:             Per RN-  Dilation:  5  Effacement:  50%  Station: -2  Presentation: Vertex     Significant Labs:  Lab Results   Component Value Date    GROUPTRH O POS 2017    HEPBSAG Negative 2017    STREPBCULT No Group B Streptococcus isolated 2017       I have personallly reviewed all pertinent lab results from the last 24 hours.    Assessment/Plan:     31 y.o. female  at 39w5d for:    * Decreased fetal movement    NST        Previous  section    Desires , has had 2x             Oliva Reed CNM  Obstetrics  [facility]

## 2017-09-25 NOTE — PROGRESS NOTES
Seen on l&d on Friday--4 cm dilated; given morphine for pain and sent home  Reports contractions q 7 minutes  Cervix 5+/80/-2  C/o decreased fetal movement; sent to l&d for nst; r/o labor  If still undelivered by next wk; bpp at appt--will be post dates; may need augmentation

## 2017-09-25 NOTE — HOSPITAL COURSE
17 at 1255hrs Observe S/S labor  1315hrs- Desires - D/W Dr Sampson Admit per protocol per orders  1735hrs- Cat 1 strip with pit at 4mu  2358hrs-  boy, Breast/bottle  17 discharge home

## 2017-09-25 NOTE — PROGRESS NOTES
[facility] -   Obstetrics  Labor Progress Note    Patient Name: Marylou Sharma  MRN: [id number]  Admission Date: 2017  Hospital Length of Stay: 0 days  Attending Physician: Stacey Sampson MD  Primary Care Provider: Benedicto Hu MD    Subjective:     Principal Problem:Previous  section    Hospital Course:  17 at 1255hrs Observe S/S labor  1315hrs- Desires - D/W Dr Smapson Admit per protocol per orders    Interval History:  Marylou is a 31 y.o.  at 39w5d. She is doing well. Uncomfortable    Objective:     Vital Signs (Most Recent):    Vital Signs (24h Range):  BP: (124)/(79) 124/79        There is no height or weight on file to calculate BMI.    FHT: 140bpm reactiveCat 1 (reassuring)  TOCO:  Q 3-5 minutes    Cervical Exam:Per RN-   Dilation:  5  Effacement:  50%  Station: -2  Presentation: Vertex     Significant Labs:  Lab Results   Component Value Date    GROUPTRH O POS 2017    HEPBSAG Negative 2017    STREPBCULT No Group B Streptococcus isolated 2017       I have personallly reviewed all pertinent lab results from the last 24 hours.    Physical Exam    Assessment/Plan:     31 y.o. female  at 39w5d for:    * Previous  section    Desires , has had 2x   1315hrs- Cat 1 strip with advancing dilation-D/W Dr sampson Admit per protocol        Decreased fetal movement    NST  1315hrs- Reactive              Oliva Reed CNM  Obstetrics  [facility]

## 2017-09-25 NOTE — PLAN OF CARE
Problem: Patient Care Overview  Goal: Plan of Care Review  Outcome: Ongoing (interventions implemented as appropriate)  Pt desires CANDIDO-. On Pitocin labor augmentation. Pt requested not to discuss medical records with family members.

## 2017-09-25 NOTE — SUBJECTIVE & OBJECTIVE
Interval History:  Marylou is a 31 y.o.  at 39w5d. She is doing well. Uncomfortable    Objective:     Vital Signs (Most Recent):    Vital Signs (24h Range):  BP: (124)/(79) 124/79        There is no height or weight on file to calculate BMI.    FHT: 140bpm reactiveCat 1 (reassuring)  TOCO:  Q 3-5 minutes    Cervical Exam:Per RN-   Dilation:  5  Effacement:  50%  Station: -2  Presentation: Vertex     Significant Labs:  Lab Results   Component Value Date    GROUPTRH O POS 2017    HEPBSAG Negative 2017    STREPBCULT No Group B Streptococcus isolated 2017       I have personallly reviewed all pertinent lab results from the last 24 hours.    Physical Exam

## 2017-09-25 NOTE — NURSING
"Discussed feeding choice with mother.  Reviewed benefits of breastfeeding.  Patient given "What to Expect in the First 48 Hours" handout. Mother states her intention is breast feeding and bottle feeding.  "

## 2017-09-25 NOTE — ASSESSMENT & PLAN NOTE
Desires , has had 2x   1315hrs- Cat 1 strip with advancing dilation-D/W Dr allred Admit per protocol

## 2017-09-26 PROBLEM — O34.219 VBAC, DELIVERED, CURRENT HOSPITALIZATION: Status: ACTIVE | Noted: 2017-09-26

## 2017-09-26 PROBLEM — Z37.9 NORMAL LABOR: Status: RESOLVED | Noted: 2017-09-25 | Resolved: 2017-09-26

## 2017-09-26 PROBLEM — O36.8190 DECREASED FETAL MOVEMENT: Status: RESOLVED | Noted: 2017-09-25 | Resolved: 2017-09-26

## 2017-09-26 PROBLEM — O36.8190 DECREASED FETAL MOVEMENT: Status: ACTIVE | Noted: 2017-09-26

## 2017-09-26 PROCEDURE — 11000001 HC ACUTE MED/SURG PRIVATE ROOM

## 2017-09-26 PROCEDURE — 25000003 PHARM REV CODE 250: Performed by: ADVANCED PRACTICE MIDWIFE

## 2017-09-26 PROCEDURE — 99231 SBSQ HOSP IP/OBS SF/LOW 25: CPT | Mod: ,,, | Performed by: ADVANCED PRACTICE MIDWIFE

## 2017-09-26 RX ORDER — HYDROCODONE BITARTRATE AND ACETAMINOPHEN 7.5; 325 MG/1; MG/1
1 TABLET ORAL EVERY 4 HOURS PRN
Status: DISCONTINUED | OUTPATIENT
Start: 2017-09-26 | End: 2017-09-27 | Stop reason: HOSPADM

## 2017-09-26 RX ORDER — OXYTOCIN/RINGER'S LACTATE 20/1000 ML
41.65 PLASTIC BAG, INJECTION (ML) INTRAVENOUS CONTINUOUS
Status: DISPENSED | OUTPATIENT
Start: 2017-09-26 | End: 2017-09-26

## 2017-09-26 RX ORDER — ACETAMINOPHEN 325 MG/1
650 TABLET ORAL EVERY 6 HOURS PRN
Status: DISCONTINUED | OUTPATIENT
Start: 2017-09-26 | End: 2017-09-27 | Stop reason: HOSPADM

## 2017-09-26 RX ORDER — HYDROCODONE BITARTRATE AND ACETAMINOPHEN 5; 325 MG/1; MG/1
1 TABLET ORAL EVERY 4 HOURS PRN
Status: DISCONTINUED | OUTPATIENT
Start: 2017-09-26 | End: 2017-09-27 | Stop reason: HOSPADM

## 2017-09-26 RX ORDER — DIPHENHYDRAMINE HYDROCHLORIDE 50 MG/ML
25 INJECTION INTRAMUSCULAR; INTRAVENOUS EVERY 4 HOURS PRN
Status: DISCONTINUED | OUTPATIENT
Start: 2017-09-26 | End: 2017-09-27 | Stop reason: HOSPADM

## 2017-09-26 RX ORDER — DOCUSATE SODIUM 100 MG/1
200 CAPSULE, LIQUID FILLED ORAL 2 TIMES DAILY PRN
Status: DISCONTINUED | OUTPATIENT
Start: 2017-09-26 | End: 2017-09-27 | Stop reason: HOSPADM

## 2017-09-26 RX ORDER — HYDROCORTISONE 25 MG/G
CREAM TOPICAL 3 TIMES DAILY PRN
Status: DISCONTINUED | OUTPATIENT
Start: 2017-09-26 | End: 2017-09-27 | Stop reason: HOSPADM

## 2017-09-26 RX ORDER — ONDANSETRON 8 MG/1
8 TABLET, ORALLY DISINTEGRATING ORAL EVERY 8 HOURS PRN
Status: DISCONTINUED | OUTPATIENT
Start: 2017-09-26 | End: 2017-09-27 | Stop reason: HOSPADM

## 2017-09-26 RX ORDER — DIPHENHYDRAMINE HCL 25 MG
25 CAPSULE ORAL EVERY 4 HOURS PRN
Status: DISCONTINUED | OUTPATIENT
Start: 2017-09-26 | End: 2017-09-27 | Stop reason: HOSPADM

## 2017-09-26 RX ORDER — IBUPROFEN 600 MG/1
600 TABLET ORAL EVERY 6 HOURS
Status: DISCONTINUED | OUTPATIENT
Start: 2017-09-26 | End: 2017-09-27 | Stop reason: HOSPADM

## 2017-09-26 RX ADMIN — IBUPROFEN 600 MG: 600 TABLET, FILM COATED ORAL at 05:09

## 2017-09-26 RX ADMIN — HYDROCODONE BITARTRATE AND ACETAMINOPHEN 1 TABLET: 7.5; 325 TABLET ORAL at 07:09

## 2017-09-26 RX ADMIN — IBUPROFEN 600 MG: 600 TABLET, FILM COATED ORAL at 11:09

## 2017-09-26 NOTE — SUBJECTIVE & OBJECTIVE
Interval History:  Marylou is a 31 y.o.  at 39w6d. She is doing well. Resting    Objective:     Vital Signs (Most Recent):  Temp: 97.9 °F (36.6 °C) (17)  Pulse: 84 (17 0118)  Resp: 20 (17)  BP: 123/80 (17)  SpO2: 100 % (17) Vital Signs (24h Range):  Temp:  [97.9 °F (36.6 °C)-98.8 °F (37.1 °C)] 97.9 °F (36.6 °C)  Pulse:  [] 84  Resp:  [18-20] 20  SpO2:  [98 %-100 %] 100 %  BP: ()/() 123/80     Weight: 99.3 kg (219 lb)  Body mass index is 41.38 kg/m².    FHT: 140bpm Cat 1 (reassuring)  TOCO:  Q 3-5 minutes  With pit at 4mu  Cervical Exam: Derferred       Significant Labs:  Lab Results   Component Value Date    GROUPTRH O POS 2017    HEPBSAG Negative 2017    STREPBCULT No Group B Streptococcus isolated 2017       I have personallly reviewed all pertinent lab results from the last 24 hours.    Physical Exam

## 2017-09-26 NOTE — PLAN OF CARE
Problem: Patient Care Overview  Goal: Plan of Care Review  Outcome: Ongoing (interventions implemented as appropriate)  Pt progressing well.  One void remains.  IV in place, CDI.  Bleeding light, no clots expressed.  Pain controlled with oral medications.  VSS. NAD

## 2017-09-26 NOTE — ASSESSMENT & PLAN NOTE
Desires , has had 2x   1315hrs- Cat 1 strip with advancing dilation-D/W Dr allred Admit per protocol  1735hrs- Cat 1 strip with pit at 4mu

## 2017-09-26 NOTE — ANESTHESIA PROCEDURE NOTES
Epidural    Patient location during procedure: OB   Start time: 9/25/2017 9:55 PM  Timeout: 9/25/2017 9:30 PM  End time: 9/25/2017 9:50 PM  Staffing  Anesthesiologist: DUONG WILCOX  Preanesthetic Checklist  Completed: patient identified, surgical consent, pre-op evaluation, timeout performed, IV checked, risks and benefits discussed, monitors and equipment checked, anesthesia consent given, hand hygiene performed and patient being monitored  Preparation  Patient position: sitting  Prep: Betadine  Patient monitoring: ECG, Pulse Ox and Blood Pressure  Epidural  Skin Anesthetic: lidocaine 1%  Skin Wheal: 3 mL  Administration type: continuous  Interspace: L3-4  Injection technique: LAILA air  Needle and Epidural Catheter  Needle type: Tuohy   Needle gauge: 18  Needle length: 3.5 inches  Catheter type: multi-orifice  Additional Documentation: incremental injection, negative aspiration for heme and CSF, no paresthesia on injection, no signs/symptoms of IV or SA injection, no significant complaints from patient and no significant pain on injection  Medications:  Bolus administered: 10 mL of 0.2% ropivacaine  Assessment  Ease of block: easy  Patient's tolerance of the procedure: comfortable throughout block and no complaints

## 2017-09-26 NOTE — TRANSFER OF CARE
"Anesthesia Transfer of Care Note    Patient: Marylou Sharma    Procedure(s) Performed: epidural    Patient location: Labor and Delivery    Anesthesia Type: epidural    Post pain: adequate analgesia    Post assessment: no apparent anesthetic complications    Post vital signs: stable    Level of consciousness: awake and alert    Nausea/Vomiting: no nausea/vomiting    Complications: none    Transfer of care protocol was followed      Last vitals:   Visit Vitals  /70 (BP Location: Left arm, Patient Position: Lying)   Pulse 77   Temp 36.8 °C (98.3 °F) (Oral)   Resp 20   Ht 5' 1" (1.549 m)   Wt 99.3 kg (219 lb)   LMP 01/24/2017 (Exact Date)   SpO2 100%   Breastfeeding? No   BMI 41.38 kg/m²     "

## 2017-09-26 NOTE — ANESTHESIA RELEASE NOTE
"Anesthesia Release from PACU Note    Patient: Marylou Sharma    Procedure(s) Performed: epidural    Anesthesia type: epidural    Post pain: Adequate analgesia    Post assessment: no apparent anesthetic complications    Last Vitals:   Visit Vitals  /70 (BP Location: Left arm, Patient Position: Lying)   Pulse 77   Temp 36.8 °C (98.3 °F) (Oral)   Resp 20   Ht 5' 1" (1.549 m)   Wt 99.3 kg (219 lb)   LMP 01/24/2017 (Exact Date)   SpO2 100%   Breastfeeding? No   BMI 41.38 kg/m²       Post vital signs: stable    Level of consciousness: awake and alert     Nausea/Vomiting: no nausea/no vomiting    Complications: none    Airway Patency: patent    Respiratory: unassisted, spontaneous ventilation, room air    Cardiovascular: stable and blood pressure at baseline    Hydration: euvolemic  "

## 2017-09-26 NOTE — PLAN OF CARE
Problem: Labor (Cervical Ripen, Induct, Augment) (Adult,Obstetrics,Pediatric)  Goal: Signs and Symptoms of Listed Potential Problems Will be Absent, Minimized or Managed (Labor)  Signs and symptoms of listed potential problems will be absent, minimized or managed by discharge/transition of care (reference Labor (Cervical Ripen, Induct, Augment) (Adult,Obstetrics,Pediatric) [facility]).  Outcome: Ongoing (interventions implemented as appropriate)  Pt currently on pitocin. Pt complaining of some pain but tolerating. Pt debating on epidural. Pt currently bouncing on ball at bedside.
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
## 2017-09-26 NOTE — ANESTHESIA PREPROCEDURE EVALUATION
09/25/2017  Marylou Sharma is a 31 y.o., female.    Anesthesia Evaluation    I have reviewed the Patient Summary Reports.    I have reviewed the Nursing Notes.   I have reviewed the Medications.     Review of Systems  Anesthesia Hx:  No problems with previous Anesthesia  Denies Family Hx of Anesthesia complications.   Denies Personal Hx of Anesthesia complications.   Social:  Non-Smoker    Cardiovascular:  Cardiovascular Normal     Pulmonary:  Pulmonary Normal        Physical Exam  General:  Obesity    Airway/Jaw/Neck:  Airway Findings: Mallampati: I     Dental:  DENTAL FINDINGS: Normal   Chest/Lungs:  Chest/Lungs Findings: Normal Respiratory Rate     Heart/Vascular:  Heart Findings: Normal            Anesthesia Plan  Type of Anesthesia, risks & benefits discussed:  Anesthesia Type:  epidural  Patient's Preference:   Intra-op Monitoring Plan:   Intra-op Monitoring Plan Comments:   Post Op Pain Control Plan:   Post Op Pain Control Plan Comments:   Induction:    Beta Blocker:  Patient is not currently on a Beta-Blocker (No further documentation required).       Informed Consent: Patient understands risks and agrees with Anesthesia plan.  Questions answered. Anesthesia consent signed with patient.  ASA Score: 2     Day of Surgery Review of History & Physical: I have interviewed and examined the patient. I have reviewed the patient's H&P dated:  There are no significant changes.          Ready For Surgery From Anesthesia Perspective.

## 2017-09-26 NOTE — ANESTHESIA POSTPROCEDURE EVALUATION
"Anesthesia Post Evaluation    Patient: Marylou Sharma    Procedure(s) Performed: epidural    Final Anesthesia Type: epidural  Patient location during evaluation: labor & delivery  Patient participation: Yes- Able to Participate  Level of consciousness: awake and alert  Post-procedure vital signs: reviewed and stable  Pain management: adequate  Airway patency: patent  PONV status at discharge: No PONV  Anesthetic complications: no      Cardiovascular status: hemodynamically stable  Respiratory status: unassisted, room air and spontaneous ventilation  Hydration status: euvolemic  Follow-up not needed.        Visit Vitals  /70 (BP Location: Left arm, Patient Position: Lying)   Pulse 77   Temp 36.8 °C (98.3 °F) (Oral)   Resp 20   Ht 5' 1" (1.549 m)   Wt 99.3 kg (219 lb)   LMP 01/24/2017 (Exact Date)   SpO2 100%   Breastfeeding? No   BMI 41.38 kg/m²       Pain/Racheal Score: Pain Rating Prior to Med Admin: 0 (9/26/2017  5:52 AM)      "

## 2017-09-26 NOTE — PROGRESS NOTES
[facility] -   Obstetrics  Postpartum Progress Note    Patient Name: Marylou Sharma  MRN: [id number]  Admission Date: 2017  Hospital Length of Stay: 1 days  Attending Physician: Stacey Sampson MD  Primary Care Provider: Benedicto Hu MD    Subjective:     Principal Problem:, delivered, current hospitalization    Hospital course: 17 at 1255hrs Observe S/S labor  1315hrs- Desires - D/W Dr Sampson Admit per protocol per orders  1735hrs- Cat 1 strip with pit at 4mu  2358hrs-  boy, Breast/bottle    Interval History:  17    She is doing well this morning. She is tolerating a regular diet without nausea or vomiting. She is voiding spontaneously. She is ambulating. She has passed flatus, and has not a BM. Vaginal bleeding is mild. She denies fever or chills. Abdominal pain is mild and controlled with oral medications. She is breast and bottle feeding.     Objective:     Vital Signs (Most Recent):  Temp: 98.6 °F (37 °C) (17 0800)  Pulse: 85 (17 0800)  Resp: 20 (17 0800)  BP: 111/68 (17 0800)  SpO2: 100 % (17 2302) Vital Signs (24h Range):  Temp:  [97.9 °F (36.6 °C)-98.8 °F (37.1 °C)] 98.6 °F (37 °C)  Pulse:  [] 85  Resp:  [20] 20  SpO2:  [98 %-100 %] 100 %  BP: ()/() 111/68     Weight: 99.3 kg (219 lb)  Body mass index is 41.38 kg/m².      Intake/Output Summary (Last 24 hours) at 17 1454  Last data filed at 17 1100   Gross per 24 hour   Intake                0 ml   Output              842 ml   Net             -842 ml       Significant Labs:  Lab Results   Component Value Date    GROUPTRH O POS 2017    HEPBSAG Negative 2017    STREPBCULT No Group B Streptococcus isolated 2017       Recent Labs  Lab 17  1353   HGB 10.9*   HCT 34.8*       I have personallly reviewed all pertinent lab results from the last 24 hours.    Physical Exam:   Constitutional: She is oriented to person, place, and time. She  appears well-developed and well-nourished.             Abdominal: Soft.   Fundus firm below umbilicus     Genitourinary:   Genitourinary Comments: Small lochia           Musculoskeletal: Normal range of motion and moves all extremeties.       Neurological: She is alert and oriented to person, place, and time.    Skin: Skin is warm and dry.    Psychiatric: She has a normal mood and affect. Her behavior is normal.       Assessment/Plan:     31 y.o. female  for:    * , delivered, current hospitalization    Routine PP care per orders  PPD #1            Disposition: As patient meets milestones, will plan to discharge tomorrow.    Lizbeth Adkins CNM  Obstetrics  [facility]

## 2017-09-26 NOTE — SUBJECTIVE & OBJECTIVE
Hospital course: 17 at 1255hrs Observe S/S labor  1315hrs- Desires - D/W Dr Sampson Admit per protocol per orders  1735hrs- Cat 1 strip with pit at 4mu  2358hrs-  boy, Breast/bottle    Interval History:  17    She is doing well this morning. She is tolerating a regular diet without nausea or vomiting. She is voiding spontaneously. She is ambulating. She has passed flatus, and has not a BM. Vaginal bleeding is mild. She denies fever or chills. Abdominal pain is mild and controlled with oral medications. She is breast and bottle feeding.     Objective:     Vital Signs (Most Recent):  Temp: 98.6 °F (37 °C) (17 0800)  Pulse: 85 (17 0800)  Resp: 20 (17 0800)  BP: 111/68 (17 0800)  SpO2: 100 % (17 2302) Vital Signs (24h Range):  Temp:  [97.9 °F (36.6 °C)-98.8 °F (37.1 °C)] 98.6 °F (37 °C)  Pulse:  [] 85  Resp:  [20] 20  SpO2:  [98 %-100 %] 100 %  BP: ()/() 111/68     Weight: 99.3 kg (219 lb)  Body mass index is 41.38 kg/m².      Intake/Output Summary (Last 24 hours) at 17 1454  Last data filed at 17 1100   Gross per 24 hour   Intake                0 ml   Output              842 ml   Net             -842 ml       Significant Labs:  Lab Results   Component Value Date    GROUPTRH O POS 2017    HEPBSAG Negative 2017    STREPBCULT No Group B Streptococcus isolated 2017       Recent Labs  Lab 17  1353   HGB 10.9*   HCT 34.8*       I have personallly reviewed all pertinent lab results from the last 24 hours.    Physical Exam:   Constitutional: She is oriented to person, place, and time. She appears well-developed and well-nourished.             Abdominal: Soft.   Fundus firm below umbilicus     Genitourinary:   Genitourinary Comments: Small lochia           Musculoskeletal: Normal range of motion and moves all extremeties.       Neurological: She is alert and oriented to person, place, and time.    Skin: Skin is warm and  dry.    Psychiatric: She has a normal mood and affect. Her behavior is normal.

## 2017-09-26 NOTE — PROGRESS NOTES
[facility]  Obstetrics  Labor Progress Note    Patient Name: Marylou Sharma  MRN: [id number]  Admission Date: 2017  Hospital Length of Stay: 1 days  Attending Physician: Stacey Sampson MD  Primary Care Provider: Benedicto Hu MD    Subjective:     Principal Problem:Previous  section    Hospital Course:  17 at 1255hrs Observe S/S labor  1315hrs- Desires - D/W Dr Sampson Admit per protocol per orders  1735hrs- Cat 1 strip with pit at 4mu    Interval History:  Marylou is a 31 y.o.  at 39w6d. She is doing well. Resting    Objective:     Vital Signs (Most Recent):  Temp: 97.9 °F (36.6 °C) (17)  Pulse: 84 (17)  Resp: 20 (17)  BP: 123/80 (17)  SpO2: 100 % (17) Vital Signs (24h Range):  Temp:  [97.9 °F (36.6 °C)-98.8 °F (37.1 °C)] 97.9 °F (36.6 °C)  Pulse:  [] 84  Resp:  [18-20] 20  SpO2:  [98 %-100 %] 100 %  BP: ()/() 123/80     Weight: 99.3 kg (219 lb)  Body mass index is 41.38 kg/m².    FHT: 140bpm Cat 1 (reassuring)  TOCO:  Q 3-5 minutes  With pit at 4mu  Cervical Exam: Derferred       Significant Labs:  Lab Results   Component Value Date    GROUPTRH O POS 2017    HEPBSAG Negative 2017    STREPBCULT No Group B Streptococcus isolated 2017       I have personallly reviewed all pertinent lab results from the last 24 hours.    Physical Exam    Assessment/Plan:     31 y.o. female  at 39w6d for:    * Previous  section    Desires , has had 2x   1315hrs- Cat 1 strip with advancing dilation-D/W Dr sampson Admit per protocol  1735hrs- Cat 1 strip with pit at 4mu        Decreased fetal movement    NST  1315hrs- Reactive              Oliva Reed, GARCÍA  Obstetrics  [facility]

## 2017-09-27 VITALS
TEMPERATURE: 98 F | BODY MASS INDEX: 41.35 KG/M2 | DIASTOLIC BLOOD PRESSURE: 65 MMHG | OXYGEN SATURATION: 100 % | SYSTOLIC BLOOD PRESSURE: 146 MMHG | HEART RATE: 66 BPM | WEIGHT: 219 LBS | HEIGHT: 61 IN | RESPIRATION RATE: 18 BRPM

## 2017-09-27 PROCEDURE — 25000003 PHARM REV CODE 250: Performed by: ADVANCED PRACTICE MIDWIFE

## 2017-09-27 PROCEDURE — 99238 HOSP IP/OBS DSCHRG MGMT 30/<: CPT | Mod: ,,, | Performed by: ADVANCED PRACTICE MIDWIFE

## 2017-09-27 RX ADMIN — IBUPROFEN 600 MG: 600 TABLET, FILM COATED ORAL at 05:09

## 2017-09-27 RX ADMIN — IBUPROFEN 600 MG: 600 TABLET, FILM COATED ORAL at 12:09

## 2017-09-27 RX ADMIN — HYDROCODONE BITARTRATE AND ACETAMINOPHEN 1 TABLET: 7.5; 325 TABLET ORAL at 08:09

## 2017-09-27 RX ADMIN — IBUPROFEN 600 MG: 600 TABLET, FILM COATED ORAL at 11:09

## 2017-09-27 RX ADMIN — HYDROCODONE BITARTRATE AND ACETAMINOPHEN 1 TABLET: 7.5; 325 TABLET ORAL at 12:09

## 2017-09-27 RX ADMIN — HYDROCODONE BITARTRATE AND ACETAMINOPHEN 1 TABLET: 7.5; 325 TABLET ORAL at 04:09

## 2017-09-27 NOTE — LACTATION NOTE
Lactation rounds  Mother states that she wants to breastfeed, but was afraid to not be able to breastfeed. Lactation packet given and admit information reviewed. Mother verbalizes understanding of expected  behaviors and output for the first 48 hours of life.  Discussed the importance of cue based feedings on demand, unrestricted access to the breast, and frequent uninterrupted skin to skin contact.  Risk and implications of artificial nipples and supplementation discussed.  Encouraged mother to call for assistance when desired or when infant is showing signs of hunger, contact number provided, mother verbalizes understanding.  Will call for lactation as needed.   Attempted to latch baby on the left breast on a football hold, but baby is very very sleepy. Latch unsuccessful.

## 2017-09-27 NOTE — DISCHARGE SUMMARY
[facility] -   Obstetrics  Discharge Summary      Patient Name: Marylou Sharma  MRN: [id number]  Admission Date: 2017  Hospital Length of Stay: 2 days  Discharge Date and Time:  2017 10:51 AM  Attending Physician: Stacey Sampson MD   Discharging Provider: Vanita Thomas CNM  Primary Care Provider: Benedicto Hu MD    HPI: 17 at 1255hrs- 31yr old  with IUP 39w5d C/O contractions    * No surgery found *     Hospital Course:   17 at 1255hrs Observe S/S labor  1315hrs- Desires - D/W Dr Sampson Admit per protocol per orders  1735hrs- Cat 1 strip with pit at 4mu  2358hrs-  boy, Breast/bottle  17 discharge home    Consults         Status Ordering Provider     Inpatient consult to Social Work  Once     Provider:  (Not yet assigned)    FABIENNE Schwarz          Final Active Diagnoses:    Diagnosis Date Noted POA    PRINCIPAL PROBLEM:  , delivered, current hospitalization [O34.219] 2017 No      Problems Resolved During this Admission:    Diagnosis Date Noted Date Resolved POA    Decreased fetal movement [O36.8190] 2017 Yes    Normal labor [O80, Z37.9] 2017 Not Applicable        Labs:   CBC   Recent Labs  Lab 17  1353   WBC 9.26   HGB 10.9*   HCT 34.8*          Feeding Method: breast    Immunizations     Date Immunization Status Dose Route/Site Given by    17 033 MMR Incomplete 0.5 mL Subcutaneous/Left deltoid     17 033 Tdap Incomplete 0.5 mL Intramuscular/Left deltoid           Delivery:    Episiotomy: None   Lacerations: None   Repair suture: None   Repair # of packets: 1   Blood loss (ml): 82     Birth information:  YOB: 2018   Time of birth: 11:58 PM   Sex: male   Delivery type: Vaginal, Spontaneous Delivery   Gestational Age: 39w5d    Delivery Clinician:      Other providers:       Additional  information:  Forceps:    Vacuum:    Breech:    Observed anomalies       Living?:           APGARS  One minute Five minutes Ten minutes   Skin color:         Heart rate:         Grimace:         Muscle tone:         Breathing:         Totals: 9  9        Placenta: Delivered:       appearance    Pending Diagnostic Studies:     None          Discharged Condition: good    Disposition: Home or Self Care    Follow Up:  Follow-up Information     Follow up In 4 weeks.               Patient Instructions:     Diet general     Activity as tolerated       Medications:  There are no discharge medications for this patient.      Vanita Thomas CNM  Obstetrics  [facility]

## 2017-09-27 NOTE — SUBJECTIVE & OBJECTIVE
Hospital course: 17 at 1255hrs Observe S/S labor  1315hrs- Desires - D/W Dr Sampson Admit per protocol per orders  1735hrs- Cat 1 strip with pit at 4mu  2358hrs-  boy, Breast/bottle  17 discharge home    Interval History:     She is doing well this morning. She is tolerating a regular diet without nausea or vomiting. She is voiding spontaneously. She is ambulating. She has passed flatus, and has a BM. Vaginal bleeding is mild. She denies fever or chills. Abdominal pain is mild and controlled with oral medications. She is breastfeeding. She desires circumcision for her male baby: yes.    Objective:     Vital Signs (Most Recent):  Temp: 98.3 °F (36.8 °C) (17 0800)  Pulse: 71 (17 0800)  Resp: 18 (17 0800)  BP: 115/73 (17 0800)  SpO2: 100 % (17 2302) Vital Signs (24h Range):  Temp:  [98.2 °F (36.8 °C)-98.5 °F (36.9 °C)] 98.3 °F (36.8 °C)  Pulse:  [64-81] 71  Resp:  [18] 18  BP: (115-125)/(73-89) 115/73     Weight: 99.3 kg (219 lb)  Body mass index is 41.38 kg/m².      Intake/Output Summary (Last 24 hours) at 17 1052  Last data filed at 17 1100   Gross per 24 hour   Intake                0 ml   Output              200 ml   Net             -200 ml       Significant Labs:  Lab Results   Component Value Date    GROUPTRH O POS 2017    HEPBSAG Negative 2017    STREPBCULT No Group B Streptococcus isolated 2017       Recent Labs  Lab 17  1353   HGB 10.9*   HCT 34.8*       I have personallly reviewed all pertinent lab results from the last 24 hours.    Physical Exam

## 2017-09-27 NOTE — PROGRESS NOTES
[facility] -   Obstetrics  Postpartum Progress Note    Patient Name: Marylou Sharma  MRN: [id number]  Admission Date: 2017  Hospital Length of Stay: 2 days  Attending Physician: Stacey Sampson MD  Primary Care Provider: Benedicto Hu MD    Subjective:     Principal Problem:, delivered, current hospitalization    Hospital course: 17 at 1255hrs Observe S/S labor  1315hrs- Desires - D/W Dr Sampson Admit per protocol per orders  1735hrs- Cat 1 strip with pit at 4mu  2358hrs-  boy, Breast/bottle  17 discharge home    Interval History:     She is doing well this morning. She is tolerating a regular diet without nausea or vomiting. She is voiding spontaneously. She is ambulating. She has passed flatus, and has a BM. Vaginal bleeding is mild. She denies fever or chills. Abdominal pain is mild and controlled with oral medications. She is breastfeeding. She desires circumcision for her male baby: yes.    Objective:     Vital Signs (Most Recent):  Temp: 98.3 °F (36.8 °C) (17 0800)  Pulse: 71 (17 0800)  Resp: 18 (17 0800)  BP: 115/73 (17 0800)  SpO2: 100 % (17 2302) Vital Signs (24h Range):  Temp:  [98.2 °F (36.8 °C)-98.5 °F (36.9 °C)] 98.3 °F (36.8 °C)  Pulse:  [64-81] 71  Resp:  [18] 18  BP: (115-125)/(73-89) 115/73     Weight: 99.3 kg (219 lb)  Body mass index is 41.38 kg/m².      Intake/Output Summary (Last 24 hours) at 17 1052  Last data filed at 17 1100   Gross per 24 hour   Intake                0 ml   Output              200 ml   Net             -200 ml       Significant Labs:  Lab Results   Component Value Date    GROUPTRH O POS 2017    HEPBSAG Negative 2017    STREPBCULT No Group B Streptococcus isolated 2017       Recent Labs  Lab 09/25/17  1353   HGB 10.9*   HCT 34.8*       I have personallly reviewed all pertinent lab results from the last 24 hours.    Physical Exam    Assessment/Plan:     31 y.o. female   for:    * , delivered, current hospitalization    Routine PP care per orders  PPD #1            Disposition: As patient meets milestones, will plan to discharge today    Vanita Thomas CNM  Obstetrics  [facility]

## 2017-09-27 NOTE — SUBJECTIVE & OBJECTIVE
Hospital course: 17 at 1255hrs Observe S/S labor  1315hrs- Desires - D/W Dr Sampson Admit per protocol per orders  1735hrs- Cat 1 strip with pit at 4mu  2358hrs-  boy, Breast/bottle  17 discharge home    Interval History: *    She is doing well this morning. She is tolerating a regular diet without nausea or vomiting. She is voiding spontaneously. She is ambulating. She has passed flatus, and has a BM. Vaginal bleeding is mild. She denies fever or chills. Abdominal pain is mild and controlled with oral medications. She is breastfeeding. She desires circumcision for her male baby: yes.    Objective:     Vital Signs (Most Recent):  Temp: 98.3 °F (36.8 °C) (17 0800)  Pulse: 71 (17 0800)  Resp: 18 (17 0800)  BP: 115/73 (17 0800)  SpO2: 100 % (17 2302) Vital Signs (24h Range):  Temp:  [98.2 °F (36.8 °C)-98.5 °F (36.9 °C)] 98.3 °F (36.8 °C)  Pulse:  [64-81] 71  Resp:  [18] 18  BP: (115-125)/(73-89) 115/73     Weight: 99.3 kg (219 lb)  Body mass index is 41.38 kg/m².      Intake/Output Summary (Last 24 hours) at 17 1048  Last data filed at 17 1100   Gross per 24 hour   Intake                0 ml   Output              200 ml   Net             -200 ml       Significant Labs:  Lab Results   Component Value Date    GROUPTRH O POS 2017    HEPBSAG Negative 2017    STREPBCULT No Group B Streptococcus isolated 2017       Recent Labs  Lab 17  1353   HGB 10.9*   HCT 34.8*       I have personallly reviewed all pertinent lab results from the last 24 hours.    Physical Exam:   Constitutional: She is oriented to person, place, and time. She appears well-developed and well-nourished.     Eyes: Conjunctivae are normal. Pupils are equal, round, and reactive to light.    Neck: Normal range of motion.    Cardiovascular: Normal rate and regular rhythm.     Pulmonary/Chest: Breath sounds normal.        Abdominal: Soft.     Genitourinary: Vagina normal and  uterus normal.           Musculoskeletal: Normal range of motion and moves all extremeties.       Neurological: She is alert and oriented to person, place, and time.    Skin: Skin is warm.    Psychiatric: She has a normal mood and affect. Her behavior is normal. Thought content normal.

## 2017-09-27 NOTE — PROGRESS NOTES
Patient provided discharge teaching and AVS.  Lactation reviewed discharge with patient, also.  VSS.  NAD.  Patient brought down to car in wheelchair with infant in arms.

## 2017-09-27 NOTE — PLAN OF CARE
Problem: Patient Care Overview  Goal: Plan of Care Review  Outcome: Ongoing (interventions implemented as appropriate)  Pt progressing well. Up ad cecil and voiding without difficulty. Pain controlled with po meds. Vitals stable. Bonding with baby. Breast and formula feeding. Anticipating discharge today.

## 2017-09-27 NOTE — DISCHARGE INSTRUCTIONS
"Mother Self Care:    Activity: Avoid strenuous exercise and get adequate rest.  No driving until the physician consent given.  Emotional Changes: Most women find birth to be a time of great emotional upheaval.  Sense of loss, mood swings, fatigue, anxiety, and feeling "let down" are common.  If feelings worsen or last more than a week, call your physician.  Breast Care/Breastfeeding: Wear a bra for comfort.  Keep nipples dry and apply your own breast milk or lanolin cream as needed for soreness.  Engorgement can be relieved with warm, moist heat before feedings.  You may also take Ibuprofen.  Breast Care/Bottle Feeding: Wear support bra 24 hours a day for one week.  Avoid stimulation to breasts.  You may use ice packs for discomfort.  Maggie-Care/Vaginal Bleeding: Remember to use your maggie-bottle after urinating.  Your flow will change from red, to pink, to yellow/white color over a period of 2 weeks.  Menstruation will return in 3-8 weeks, or longer if breastfeeding.  Episiotomy Vaginal Delivery: Stitches will dissolve within 10 days to 3 weeks.  Warm baths, tucks, and dermoplast will promote healing.  Avoid bubble baths or strong soaps.   Section/Tubal Ligation: Keep incision clean and dry.  Please remove steri-strips in 5-7 days.  You may shower, but avoid baths.  Sexual Activity/Pelvic Rest: No sexual activity, tampons, or douching until your physician gives you consent.  Diet: Continue to eat from the five basic food groups, including plenty of protein, fruits, vegetables, and whole grains.  Limit empty calories and high fat foods.  Drink enough fluids to satisfy thirst and add an extra 500 calories for breastfeeding.  Constipation/Hemorrhoids: Drink plenty of water.  You may take a stool softener or natural laxative (Metamucil). You may use tucks or hemorrhoid ointment and soak in a warm tub.    CALL YOUR OB DOCTOR IF ANY OF THE FOLLOWING OCCURS:  *Heavy bleeding - saturating a pad an hour or passing any " large (2-3 inches in size) blood clots.  *Any pain, redness, or tenderness in lower leg.  *You cannot care for yourself or your baby.  *Any signs of infection-      - Temperature greater than 100.5 degrees F      - Foul smelling vaginal discharge and/or incisional drainage      - Increased episiotomy or incisional pain      - Hot, hard, red or sore area on breast      - Flu-like symptoms      - Any urgency, frequency or burning with urination

## 2017-10-10 ENCOUNTER — OFFICE VISIT (OUTPATIENT)
Dept: OBSTETRICS AND GYNECOLOGY | Facility: CLINIC | Age: 32
End: 2017-10-10
Payer: MEDICAID

## 2017-10-10 VITALS
SYSTOLIC BLOOD PRESSURE: 114 MMHG | WEIGHT: 193.31 LBS | DIASTOLIC BLOOD PRESSURE: 81 MMHG | BODY MASS INDEX: 36.5 KG/M2 | HEIGHT: 61 IN

## 2017-10-10 DIAGNOSIS — Z30.09 ENCOUNTER FOR OTHER GENERAL COUNSELING OR ADVICE ON CONTRACEPTION: Primary | ICD-10-CM

## 2017-10-10 PROCEDURE — 99999 PR PBB SHADOW E&M-EST. PATIENT-LVL III: CPT | Mod: PBBFAC,,, | Performed by: OBSTETRICS & GYNECOLOGY

## 2017-10-10 PROCEDURE — 99213 OFFICE O/P EST LOW 20 MIN: CPT | Mod: PBBFAC,PN | Performed by: OBSTETRICS & GYNECOLOGY

## 2017-10-10 PROCEDURE — 99499 UNLISTED E&M SERVICE: CPT | Mod: S$PBB,,, | Performed by: OBSTETRICS & GYNECOLOGY

## 2017-10-10 NOTE — PROGRESS NOTES
Subjective:       Patient ID: Marylou Sharma is a 31 y.o. female.    Chief Complaint:  Consult      History of Present Illness  HPI  here for problem   S/p sucessful ; wants to proceed with lap tl  Reports menses off depo/ocp--very heavy, 7 days, terrible cramps    Reviewed lap tl procedure and post op course, , risk of failure;   Pt aware menses may return to 7 days, heavy, clots, dysmenorrhea    GYN & OB History  Patient's last menstrual period was 2017 (exact date).   Date of Last Pap: No result found    OB History    Para Term  AB Living   6 4 0 0 1 4   SAB TAB Ectopic Multiple Live Births   0 0 1   4      # Outcome Date GA Lbr Jeremy/2nd Weight Sex Delivery Anes PTL Lv   6 Current            5 Ectopic         DEC   4 Para      Vag-Spont   MELISSA   3 Para      Vag-Spont   MELISSA   2 Para      CS-LTranv   MELISSA   1 Para      CS-LTranv   MELISSA          Review of Systems  Review of Systems   Constitutional: Negative for activity change, appetite change, chills, diaphoresis, fatigue, fever and unexpected weight change.   HENT: Negative for mouth sores and tinnitus.    Eyes: Negative for discharge and visual disturbance.   Respiratory: Negative for cough, shortness of breath and wheezing.    Cardiovascular: Negative for chest pain, palpitations and leg swelling.   Gastrointestinal: Negative for abdominal pain, bloating, blood in stool, constipation, diarrhea, nausea and vomiting.   Endocrine: Negative for diabetes, hair loss, hot flashes, hyperthyroidism and hypothyroidism.   Genitourinary: Negative for decreased libido, dyspareunia, dysuria, flank pain, frequency, genital sores, hematuria, menorrhagia, menstrual problem, pelvic pain, urgency, vaginal bleeding, vaginal discharge, vaginal pain, dysmenorrhea, urinary incontinence, postcoital bleeding, postmenopausal bleeding and vaginal odor.   Musculoskeletal: Negative for back pain and myalgias.   Skin:  Negative for rash, no acne and hair changes.    Neurological: Negative for seizures, syncope, numbness and headaches.   Hematological: Negative for adenopathy. Does not bruise/bleed easily.   Psychiatric/Behavioral: Negative for depression and sleep disturbance. The patient is not nervous/anxious.    Breast: Negative for breast mass, breast pain, nipple discharge and skin changes          Objective:    Physical Exam:   Constitutional: She appears well-developed.     Eyes: Conjunctivae and EOM are normal. Pupils are equal, round, and reactive to light.    Neck: Normal range of motion. Neck supple.     Pulmonary/Chest: Effort normal.        Abdominal: Soft.             Musculoskeletal: Normal range of motion.       Neurological: She is alert.    Skin: Skin is warm.    Psychiatric: She has a normal mood and affect.          Assessment:     Encounter Diagnosis   Name Primary?    Encounter for other general counseling or advice on contraception Yes                Plan:      Reviewed contraceptive options--ocp, depo, nuva ring, nexplanon, iud, patch, tubal ligation  Reviewed uses of each, risks and benefits.  Pt elects trial of mirena iud instead of tubal ligation  Return week 6 for insertion  Pt aware to premedicate with nsaid prior to mirena insertion

## 2017-10-12 ENCOUNTER — TELEPHONE (OUTPATIENT)
Dept: OBSTETRICS AND GYNECOLOGY | Facility: CLINIC | Age: 32
End: 2017-10-12

## 2023-02-11 NOTE — L&D DELIVERY NOTE
Called for delivery. SROM at 2235hrs- clear fluid and rapid progress with Cat 1 strip   live male infant, kimi-pharyngeal suction on perineum, placed on moms chest  Cord clamped x 2 and cut with cessation of pulsation  Placenta and membranes delivered intact with CCT, Fundus firm and well contracted  EBL- 84ccs. Intact perineum. Small superficial posterior vaginal wall laceration sutured with Vicryl  Baby boy Apgars 8+9 Wt 8 pounds even.  Mother and baby stable. Skin to skin. Plans on breast/bottle feeding  Delivery Information for  Ashok Sharma    Birth information:  YOB: 2018   Time of birth: 11:58 PM   Sex: male   Head Delivery Date/Time: 2017 11:57 PM   Delivery type: Vaginal, Spontaneous Delivery   Gestational Age: 39w5d    Delivery Providers    Delivering clinician:  Oliva Reed CNM   Other personnel:   Provider Role   Diana Caballero RN Registered Nurse   Marisel Stallings, RN Registered Nurse               [place] Measurements    Weight:  3630 g Length:  53.3 cm   Head circum.:  35 cm Chest circum.:  35.5 cm   Abdominal girth:  35 cm          Assessment    Living status:  Living  Apgars:     1 Minute:   5 Minute:   10 Minute:   15 Minute:   20 Minute:     Skin Color:   1  1       Heart Rate:   2  2       Reflex Irritability:   2  2       Muscle Tone:   2  2       Respiratory Effort:   2  2       Total:   9  9               Apgars Assigned By:  MARISEL STALLINGS         Assisted Delivery Details:    Forceps attempted?:  No  Vacuum extractor attempted?:  No         Shoulder Dystocia    Shoulder dystocia present?:  No           Presentation and Position    Presentation:   Vertex   Position:   Middle    Occiput    Anterior            Interventions/Resuscitation    Method:  None, Bulb Suctioning, Tactile Stimulation       Cord    Vessels:  3 vessels  Complications:  None  Delayed Cord Clamping?:  No  Cord Clamped Date/Time:  2017 12:00 AM  Cord Blood Disposition:   11-Feb-2023 23:40 Lab  Gases Sent?:  No  Stem Cell Collection (by MD):  No       Placenta    Date and time:  2017 12:02 AM  Removal:  Spontaneous  Appearance:  Intact  Placenta disposition:  discarded           Labor Events:       labor: No     Labor Onset Date/Time:         Dilation Complete Date/Time: 2017 23:00     Start Pushing Date/Time: 2017 23:55     Rupture Date/Time:              Rupture type:           Fluid Amount:        Fluid Color:        Fluid Odor:        Membrane Status (PeriCalm): SRM (Spontaneous Rupture)      Rupture Date/Time (PeriCalm): 2017 22:35:00      Fluid Amount (PeriCalm): Moderate      Fluid Color (PeriCalm): Clear       steroids: None     Antibiotics given for GBS: No     Induction: none     Indications for induction:        Augmentation: oxytocin     Indications for augmentation: Ineffective Contraction Pattern     Labor complications: None     Additional complications:          Cervical ripening:                     Delivery:      Episiotomy: None     Indication for Episiotomy:       Perineal Lacerations: None Repaired:      Periurethral Laceration: none Repaired:     Labial Laceration: none Repaired:     Sulcus Laceration: none Repaired:     Vaginal Laceration: Yes Repaired: Yes   Cervical Laceration: No Repaired:     Repair suture: None     Repair # of packets: 1     Vaginal delivery QBL (mL): 82      QBL (mL): 0     Combined Blood Loss (mL): 82     Vaginal Sweep Performed: No     Surgicount Correct: No       Other providers:       Anesthesia    Method:  Epidural          Details (if applicable):  Trial of Labor      Categorization:      Priority:     Indications for :     Incision Type:       Additional  information:  Forceps:    Vacuum:    Breech:    Observed anomalies    Other (Comments):